# Patient Record
Sex: FEMALE | NOT HISPANIC OR LATINO | Employment: OTHER | RURAL
[De-identification: names, ages, dates, MRNs, and addresses within clinical notes are randomized per-mention and may not be internally consistent; named-entity substitution may affect disease eponyms.]

---

## 2022-04-18 ENCOUNTER — OFFICE VISIT (OUTPATIENT)
Dept: FAMILY MEDICINE CLINIC | Facility: CLINIC | Age: 37
End: 2022-04-18

## 2022-04-18 VITALS
WEIGHT: 232 LBS | HEIGHT: 65 IN | HEART RATE: 71 BPM | BODY MASS INDEX: 38.65 KG/M2 | DIASTOLIC BLOOD PRESSURE: 82 MMHG | SYSTOLIC BLOOD PRESSURE: 122 MMHG | OXYGEN SATURATION: 97 %

## 2022-04-18 DIAGNOSIS — E56.9 VITAMIN DEFICIENCY: ICD-10-CM

## 2022-04-18 DIAGNOSIS — E78.2 MIXED HYPERLIPIDEMIA: ICD-10-CM

## 2022-04-18 DIAGNOSIS — M54.50 LOW BACK PAIN WITHOUT SCIATICA, UNSPECIFIED BACK PAIN LATERALITY, UNSPECIFIED CHRONICITY: ICD-10-CM

## 2022-04-18 DIAGNOSIS — K21.9 GASTROESOPHAGEAL REFLUX DISEASE WITHOUT ESOPHAGITIS: ICD-10-CM

## 2022-04-18 DIAGNOSIS — Z00.00 ROUTINE MEDICAL EXAM: Primary | ICD-10-CM

## 2022-04-18 DIAGNOSIS — F32.A DEPRESSION, UNSPECIFIED DEPRESSION TYPE: ICD-10-CM

## 2022-04-18 DIAGNOSIS — Z13.1 SCREENING FOR DIABETES MELLITUS: ICD-10-CM

## 2022-04-18 DIAGNOSIS — G43.909 MIGRAINE WITHOUT STATUS MIGRAINOSUS, NOT INTRACTABLE, UNSPECIFIED MIGRAINE TYPE: ICD-10-CM

## 2022-04-18 PROCEDURE — 99395 PREV VISIT EST AGE 18-39: CPT | Performed by: NURSE PRACTITIONER

## 2022-04-18 PROCEDURE — 36415 COLL VENOUS BLD VENIPUNCTURE: CPT | Performed by: NURSE PRACTITIONER

## 2022-04-18 RX ORDER — SERTRALINE HYDROCHLORIDE 25 MG/1
25 TABLET, FILM COATED ORAL DAILY
Qty: 90 TABLET | Refills: 3 | Status: SHIPPED | OUTPATIENT
Start: 2022-04-18 | End: 2022-04-25

## 2022-04-18 RX ORDER — ATORVASTATIN CALCIUM 20 MG/1
TABLET, FILM COATED ORAL
COMMUNITY
End: 2022-04-18 | Stop reason: SDUPTHER

## 2022-04-18 RX ORDER — ESOMEPRAZOLE MAGNESIUM 40 MG/1
CAPSULE, DELAYED RELEASE ORAL
COMMUNITY
End: 2022-04-18 | Stop reason: SDUPTHER

## 2022-04-18 RX ORDER — FLUCONAZOLE 150 MG/1
TABLET ORAL
COMMUNITY
Start: 2022-03-29 | End: 2022-04-18

## 2022-04-18 RX ORDER — FAMOTIDINE 40 MG/1
TABLET, FILM COATED ORAL
COMMUNITY
End: 2022-04-18 | Stop reason: SDUPTHER

## 2022-04-18 RX ORDER — SERTRALINE HYDROCHLORIDE 25 MG/1
25 TABLET, FILM COATED ORAL DAILY
COMMUNITY
End: 2022-04-18 | Stop reason: SDUPTHER

## 2022-04-18 RX ORDER — RIZATRIPTAN BENZOATE 10 MG/1
TABLET, ORALLY DISINTEGRATING ORAL
COMMUNITY

## 2022-04-18 RX ORDER — FAMOTIDINE 40 MG/1
40 TABLET, FILM COATED ORAL 2 TIMES DAILY
Qty: 180 TABLET | Refills: 3 | Status: SHIPPED | OUTPATIENT
Start: 2022-04-18 | End: 2022-11-28

## 2022-04-18 RX ORDER — ATORVASTATIN CALCIUM 20 MG/1
20 TABLET, FILM COATED ORAL DAILY
Qty: 90 TABLET | Refills: 3 | Status: SHIPPED | OUTPATIENT
Start: 2022-04-18

## 2022-04-18 RX ORDER — TOPIRAMATE 25 MG/1
TABLET ORAL
COMMUNITY
Start: 2022-04-15 | End: 2022-11-28 | Stop reason: ALTCHOICE

## 2022-04-18 RX ORDER — CHLORDIAZEPOXIDE HYDROCHLORIDE AND CLIDINIUM BROMIDE 5; 2.5 MG/1; MG/1
CAPSULE ORAL EVERY 8 HOURS SCHEDULED
COMMUNITY
End: 2022-04-18 | Stop reason: SDUPTHER

## 2022-04-18 RX ORDER — ESOMEPRAZOLE MAGNESIUM 40 MG/1
40 CAPSULE, DELAYED RELEASE ORAL
Qty: 90 CAPSULE | Refills: 3 | Status: SHIPPED | OUTPATIENT
Start: 2022-04-18

## 2022-04-18 RX ORDER — CHLORDIAZEPOXIDE HYDROCHLORIDE AND CLIDINIUM BROMIDE 5; 2.5 MG/1; MG/1
1 CAPSULE ORAL
Qty: 270 CAPSULE | Refills: 3 | Status: SHIPPED | OUTPATIENT
Start: 2022-04-18 | End: 2023-03-27

## 2022-04-18 NOTE — PROGRESS NOTES
"Chief Complaint  Annual Exam    Subjective          Amanda Le presents to [facility] for preventative yearly exam.   Patient is here for a physical exam and medication refills.  She has been trying to lose weight with exercise and watching her diet but has not been able to do so.  Her periods have become irregular.      Objective   Vital Signs:   /82   Pulse 71   Ht 165.1 cm (65\")   Wt 105 kg (232 lb)   SpO2 97%   BMI 38.61 kg/m²     Body mass index is 38.61 kg/m².    Predictive Model Details   No score data available for Risk of Fall        PHQ-9 Depression Screening  Little interest or pleasure in doing things? 0-->not at all   Feeling down, depressed, or hopeless? 0-->not at all   Trouble falling or staying asleep, or sleeping too much?     Feeling tired or having little energy?     Poor appetite or overeating?     Feeling bad about yourself - or that you are a failure or have let yourself or your family down?     Trouble concentrating on things, such as reading the newspaper or watching television?     Moving or speaking so slowly that other people could have noticed? Or the opposite - being so fidgety or restless that you have been moving around a lot more than usual?     Thoughts that you would be better off dead, or of hurting yourself in some way?     PHQ-9 Total Score 0   If you checked off any problems, how difficult have these problems made it for you to do your work, take care of things at home, or get along with other people?       Health Maintenance   Topic Date Due   • ANNUAL PHYSICAL  Never done   • COVID-19 Vaccine (1) Never done   • HEPATITIS C SCREENING  Never done   • PAP SMEAR  Never done   • LIPID PANEL  Never done   • INFLUENZA VACCINE  08/01/2022   • TDAP/TD VACCINES (2 - Tdap) 08/01/2024   • Pneumococcal Vaccine 0-64  Aged Out        Immunization History   Administered Date(s) Administered   • Td 08/01/2014       Review of Systems "   Constitutional: Negative for fatigue and fever.   Respiratory: Negative for shortness of breath.    Cardiovascular: Negative for chest pain, palpitations and leg swelling.   Neurological: Negative for syncope.   Psychiatric/Behavioral: The patient is not nervous/anxious.         Negative depression        Past History:  Medical History: has a past medical history of High cholesterol (01/26/2016) and oral abscess.   Surgical History: has a past surgical history that includes Esophageal dilation (10/2019) and Cholecystectomy (2017).   Family History: family history includes Alzheimer's disease in her maternal grandfather; Coronary artery disease in her mother; Diabetes in her maternal grandfather, mother, and sister; Hypertension in her brother; Obesity in her brother.   Social History: reports that she has never smoked. She has never used smokeless tobacco. She reports current alcohol use. She reports that she does not use drugs.       Allergies: Sulfamethoxazole and Trimethoprim    Physical Exam  Constitutional:       Appearance: Normal appearance.   HENT:      Head: Normocephalic.   Eyes:      Conjunctiva/sclera: Conjunctivae normal.      Pupils: Pupils are equal, round, and reactive to light.   Cardiovascular:      Rate and Rhythm: Normal rate and regular rhythm.      Heart sounds: Normal heart sounds.   Pulmonary:      Effort: Pulmonary effort is normal.      Breath sounds: Normal breath sounds.   Abdominal:      Tenderness: There is no abdominal tenderness.   Musculoskeletal:         General: Normal range of motion.   Skin:     General: Skin is warm and dry.      Capillary Refill: Capillary refill takes less than 2 seconds.   Neurological:      General: No focal deficit present.      Mental Status: She is alert and oriented to person, place, and time.   Psychiatric:         Mood and Affect: Mood normal.         Behavior: Behavior normal.         Thought Content: Thought content normal.         Judgment:  Judgment normal.          Result Review :                   Assessment and Plan    Diagnoses and all orders for this visit:    1. Routine medical exam (Primary)  Comments:  We discussed diet and exercise.  Labs drawn.  Patient will schedule Pap smear when desired.  Orders:  -     CBC & Differential; Future  -     Comprehensive Metabolic Panel; Future  -     TSH Rfx On Abnormal To Free T4; Future  -     CBC & Differential  -     Comprehensive Metabolic Panel  -     TSH Rfx On Abnormal To Free T4    2. Screening for diabetes mellitus  -     Hemoglobin A1c; Future  -     Hemoglobin A1c    3. Mixed hyperlipidemia  Assessment & Plan:  Continue current medications.  We discussed diet and exercise.  Labs drawn.    Orders:  -     atorvastatin (LIPITOR) 20 MG tablet; Take 1 tablet by mouth Daily.  Dispense: 90 tablet; Refill: 3  -     Lipid Panel; Future  -     Lipid Panel    4. Vitamin deficiency  -     Vitamin B12; Future  -     Vitamin D 25 Hydroxy; Future  -     Folate; Future  -     Vitamin B12  -     Vitamin D 25 Hydroxy  -     Folate    5. Low back pain without sciatica, unspecified back pain laterality, unspecified chronicity  Comments:  X-rays done.  Apply ice to painful areas and ROM stretching.  Return for worsening symptoms.  Orders:  -     XR Spine Lumbar 2 or 3 View; Future  -     XR Sacroiliac Joints 1 or 2 View; Future    6. Gastroesophageal reflux disease without esophagitis  Comments:  Continue current medications.  Assessment & Plan:  Continue current medications.    Orders:  -     clidinium-chlordiazePOXIDE (LIBRAX) 5-2.5 MG per capsule; Take 1 capsule by mouth 3 (Three) Times a Day With Meals.  Dispense: 270 capsule; Refill: 3  -     esomeprazole (nexIUM) 40 MG capsule; Take 1 capsule by mouth Every Morning Before Breakfast.  Dispense: 90 capsule; Refill: 3  -     famotidine (PEPCID) 40 MG tablet; Take 1 tablet by mouth 2 (Two) Times a Day.  Dispense: 180 tablet; Refill: 3    7. Depression, unspecified  depression type  Assessment & Plan:  Condition controlled.  Continue current medications.    Orders:  -     sertraline (ZOLOFT) 25 MG tablet; Take 1 tablet by mouth Daily.  Dispense: 90 tablet; Refill: 3    8. Migraine without status migrainosus, not intractable, unspecified migraine type  Comments:  Continue current medications                  Current Outpatient Medications:   •  atorvastatin (LIPITOR) 20 MG tablet, Take 1 tablet by mouth Daily., Disp: 90 tablet, Rfl: 3  •  clidinium-chlordiazePOXIDE (LIBRAX) 5-2.5 MG per capsule, Take 1 capsule by mouth 3 (Three) Times a Day With Meals., Disp: 270 capsule, Rfl: 3  •  esomeprazole (nexIUM) 40 MG capsule, Take 1 capsule by mouth Every Morning Before Breakfast., Disp: 90 capsule, Rfl: 3  •  famotidine (PEPCID) 40 MG tablet, Take 1 tablet by mouth 2 (Two) Times a Day., Disp: 180 tablet, Rfl: 3  •  rizatriptan MLT (MAXALT-MLT) 10 MG disintegrating tablet, Maxalt-MLT 10 mg disintegrating tablet  Take 1 tablet as needed by oral route., Disp: , Rfl:   •  sertraline (ZOLOFT) 25 MG tablet, Take 1 tablet by mouth Daily., Disp: 90 tablet, Rfl: 3  •  topiramate (TOPAMAX) 25 MG tablet, , Disp: , Rfl:     Follow Up   Return in about 6 months (around 10/18/2022).  Patient was given instructions and counseling regarding her condition or for health maintenance advice. Please see specific information pulled into the AVS if appropriate.     ABHIJIT Maynard

## 2022-04-19 ENCOUNTER — PATIENT MESSAGE (OUTPATIENT)
Dept: FAMILY MEDICINE CLINIC | Facility: CLINIC | Age: 37
End: 2022-04-19

## 2022-04-19 LAB
25(OH)D3+25(OH)D2 SERPL-MCNC: 33.3 NG/ML (ref 30–100)
ALBUMIN SERPL-MCNC: 4.6 G/DL (ref 3.8–4.8)
ALBUMIN/GLOB SERPL: 1.5 {RATIO} (ref 1.2–2.2)
ALP SERPL-CCNC: 81 IU/L (ref 44–121)
ALT SERPL-CCNC: 15 IU/L (ref 0–32)
AST SERPL-CCNC: 13 IU/L (ref 0–40)
BASOPHILS # BLD AUTO: 0.1 X10E3/UL (ref 0–0.2)
BASOPHILS NFR BLD AUTO: 1 %
BILIRUB SERPL-MCNC: 0.3 MG/DL (ref 0–1.2)
BUN SERPL-MCNC: 15 MG/DL (ref 6–20)
BUN/CREAT SERPL: 17 (ref 9–23)
CALCIUM SERPL-MCNC: 9.5 MG/DL (ref 8.7–10.2)
CHLORIDE SERPL-SCNC: 104 MMOL/L (ref 96–106)
CHOLEST SERPL-MCNC: 207 MG/DL (ref 100–199)
CO2 SERPL-SCNC: 19 MMOL/L (ref 20–29)
CREAT SERPL-MCNC: 0.86 MG/DL (ref 0.57–1)
EGFRCR SERPLBLD CKD-EPI 2021: 90 ML/MIN/1.73
EOSINOPHIL # BLD AUTO: 0.4 X10E3/UL (ref 0–0.4)
EOSINOPHIL NFR BLD AUTO: 4 %
ERYTHROCYTE [DISTWIDTH] IN BLOOD BY AUTOMATED COUNT: 13.6 % (ref 11.7–15.4)
FOLATE SERPL-MCNC: >20 NG/ML
GLOBULIN SER CALC-MCNC: 3 G/DL (ref 1.5–4.5)
GLUCOSE SERPL-MCNC: 100 MG/DL (ref 65–99)
HBA1C MFR BLD: 5.3 % (ref 4.8–5.6)
HCT VFR BLD AUTO: 40.2 % (ref 34–46.6)
HDLC SERPL-MCNC: 35 MG/DL
HGB BLD-MCNC: 13.2 G/DL (ref 11.1–15.9)
IMM GRANULOCYTES # BLD AUTO: 0.1 X10E3/UL (ref 0–0.1)
IMM GRANULOCYTES NFR BLD AUTO: 1 %
LDLC SERPL CALC-MCNC: 109 MG/DL (ref 0–99)
LYMPHOCYTES # BLD AUTO: 2.6 X10E3/UL (ref 0.7–3.1)
LYMPHOCYTES NFR BLD AUTO: 28 %
MCH RBC QN AUTO: 29.3 PG (ref 26.6–33)
MCHC RBC AUTO-ENTMCNC: 32.8 G/DL (ref 31.5–35.7)
MCV RBC AUTO: 89 FL (ref 79–97)
MONOCYTES # BLD AUTO: 0.5 X10E3/UL (ref 0.1–0.9)
MONOCYTES NFR BLD AUTO: 5 %
NEUTROPHILS # BLD AUTO: 5.7 X10E3/UL (ref 1.4–7)
NEUTROPHILS NFR BLD AUTO: 61 %
PLATELET # BLD AUTO: 275 X10E3/UL (ref 150–450)
POTASSIUM SERPL-SCNC: 3.9 MMOL/L (ref 3.5–5.2)
PROT SERPL-MCNC: 7.6 G/DL (ref 6–8.5)
RBC # BLD AUTO: 4.51 X10E6/UL (ref 3.77–5.28)
SODIUM SERPL-SCNC: 137 MMOL/L (ref 134–144)
TRIGL SERPL-MCNC: 368 MG/DL (ref 0–149)
TSH SERPL DL<=0.005 MIU/L-ACNC: 2.76 UIU/ML (ref 0.45–4.5)
VIT B12 SERPL-MCNC: 628 PG/ML (ref 232–1245)
VLDLC SERPL CALC-MCNC: 63 MG/DL (ref 5–40)
WBC # BLD AUTO: 9.3 X10E3/UL (ref 3.4–10.8)

## 2022-04-20 NOTE — PROGRESS NOTES
Here are your recent lab results.  Your cholesterol levels were elevated but but not terrible.  Lets continue your current medication and try to watch her diet more closely for sugars, fats, and carbohydrates.  Try to exercise several days a week if possible.  Everything else looked good.  Take care!

## 2022-04-21 DIAGNOSIS — M54.50 LUMBAR PAIN: Primary | ICD-10-CM

## 2022-05-12 DIAGNOSIS — E78.2 MIXED HYPERLIPIDEMIA: ICD-10-CM

## 2022-05-12 RX ORDER — ATORVASTATIN CALCIUM 20 MG/1
TABLET, FILM COATED ORAL
Qty: 30 TABLET | OUTPATIENT
Start: 2022-05-12

## 2022-07-12 ENCOUNTER — OFFICE VISIT (OUTPATIENT)
Dept: FAMILY MEDICINE CLINIC | Facility: CLINIC | Age: 37
End: 2022-07-12

## 2022-07-12 VITALS
SYSTOLIC BLOOD PRESSURE: 122 MMHG | BODY MASS INDEX: 39.49 KG/M2 | OXYGEN SATURATION: 98 % | HEIGHT: 65 IN | WEIGHT: 237 LBS | DIASTOLIC BLOOD PRESSURE: 80 MMHG | HEART RATE: 88 BPM

## 2022-07-12 DIAGNOSIS — N39.0 URINARY TRACT INFECTION WITHOUT HEMATURIA, SITE UNSPECIFIED: Primary | ICD-10-CM

## 2022-07-12 DIAGNOSIS — G58.8 PUDENDAL NEURALGIA: ICD-10-CM

## 2022-07-12 DIAGNOSIS — M54.50 LUMBAR PAIN: ICD-10-CM

## 2022-07-12 PROBLEM — R07.89 ATYPICAL CHEST PAIN: Status: ACTIVE | Noted: 2021-05-11

## 2022-07-12 PROBLEM — R13.19 ESOPHAGEAL DYSPHAGIA: Status: ACTIVE | Noted: 2021-05-11

## 2022-07-12 PROBLEM — R09.A2 SENSATION OF LUMP IN THROAT: Status: ACTIVE | Noted: 2021-05-11

## 2022-07-12 PROBLEM — R22.1 SENSATION OF LUMP IN THROAT: Status: ACTIVE | Noted: 2021-05-11

## 2022-07-12 LAB
BILIRUB BLD-MCNC: ABNORMAL MG/DL
CLARITY, POC: CLEAR
COLOR UR: YELLOW
EXPIRATION DATE: ABNORMAL
GLUCOSE UR STRIP-MCNC: NEGATIVE MG/DL
KETONES UR QL: NEGATIVE
LEUKOCYTE EST, POC: ABNORMAL
Lab: ABNORMAL
NITRITE UR-MCNC: POSITIVE MG/ML
PH UR: 6 [PH] (ref 5–8)
PROT UR STRIP-MCNC: ABNORMAL MG/DL
RBC # UR STRIP: ABNORMAL /UL
SP GR UR: 1.02 (ref 1–1.03)
UROBILINOGEN UR QL: NORMAL

## 2022-07-12 PROCEDURE — 81003 URINALYSIS AUTO W/O SCOPE: CPT | Performed by: NURSE PRACTITIONER

## 2022-07-12 PROCEDURE — 99214 OFFICE O/P EST MOD 30 MIN: CPT | Performed by: NURSE PRACTITIONER

## 2022-07-12 RX ORDER — GABAPENTIN 100 MG/1
100 CAPSULE ORAL 3 TIMES DAILY
Qty: 90 CAPSULE | Refills: 1 | Status: SHIPPED | OUTPATIENT
Start: 2022-07-12 | End: 2022-09-13

## 2022-07-12 RX ORDER — CIPROFLOXACIN 500 MG/1
500 TABLET, FILM COATED ORAL 2 TIMES DAILY
Qty: 20 TABLET | Refills: 0 | Status: SHIPPED | OUTPATIENT
Start: 2022-07-12 | End: 2022-11-28

## 2022-07-12 NOTE — PROGRESS NOTES
"Chief Complaint  Back Pain (Follow up) and Urinary Tract Infection (Follow up)    Subjective          Amanda Le presents to [facility]  Pt is here to follow up on her back pain. She has been doing PT and states her back pain has improved mildly. She has ongoing pelvic burning/stinging/pain. She has seen GYN for this several times. She has had dysuria and frequency for the past several weeks. She finished Clindamycin from [facility].      Objective   Vital Signs:   /80   Pulse 88   Ht 165.1 cm (65\")   Wt 108 kg (237 lb)   SpO2 98%   BMI 39.44 kg/m²     Body mass index is 39.44 kg/m².    Review of Systems   Constitutional: Negative for fatigue and fever.   Respiratory: Negative for shortness of breath.    Cardiovascular: Negative for chest pain, palpitations and leg swelling.   Genitourinary: Positive for dysuria, pelvic pain, urgency and vaginal pain.   Musculoskeletal: Positive for back pain.   Neurological: Negative for syncope.   Psychiatric/Behavioral: The patient is not nervous/anxious.           Current Outpatient Medications:   •  atorvastatin (LIPITOR) 20 MG tablet, Take 1 tablet by mouth Daily., Disp: 90 tablet, Rfl: 3  •  clidinium-chlordiazePOXIDE (LIBRAX) 5-2.5 MG per capsule, Take 1 capsule by mouth 3 (Three) Times a Day With Meals., Disp: 270 capsule, Rfl: 3  •  esomeprazole (nexIUM) 40 MG capsule, Take 1 capsule by mouth Every Morning Before Breakfast., Disp: 90 capsule, Rfl: 3  •  famotidine (PEPCID) 40 MG tablet, Take 1 tablet by mouth 2 (Two) Times a Day., Disp: 180 tablet, Rfl: 3  •  rizatriptan MLT (MAXALT-MLT) 10 MG disintegrating tablet, Maxalt-MLT 10 mg disintegrating tablet  Take 1 tablet as needed by oral route., Disp: , Rfl:   •  sertraline (Zoloft) 50 MG tablet, Take 1 tablet by mouth Daily., Disp: 90 tablet, Rfl: 3  •  topiramate (TOPAMAX) 25 MG tablet, , Disp: , Rfl:       Allergies: Sulfamethoxazole and Trimethoprim    Physical " Exam  Constitutional:       Appearance: Normal appearance.   HENT:      Head: Normocephalic.   Eyes:      Conjunctiva/sclera: Conjunctivae normal.      Pupils: Pupils are equal, round, and reactive to light.   Cardiovascular:      Rate and Rhythm: Normal rate and regular rhythm.      Heart sounds: Normal heart sounds.   Pulmonary:      Effort: Pulmonary effort is normal.      Breath sounds: Normal breath sounds.   Abdominal:      Tenderness: There is no abdominal tenderness.   Musculoskeletal:         General: Normal range of motion.      Comments: Lumbar tenderness   Skin:     General: Skin is warm and dry.      Capillary Refill: Capillary refill takes less than 2 seconds.   Neurological:      General: No focal deficit present.      Mental Status: She is alert and oriented to person, place, and time.   Psychiatric:         Mood and Affect: Mood normal.         Behavior: Behavior normal.         Thought Content: Thought content normal.         Judgment: Judgment normal.          Result Review :                   Assessment and Plan    Diagnoses and all orders for this visit:    1. Urinary tract infection without hematuria, site unspecified (Primary)  Comments:  UA and cx done. Increase fluids and void often.   Orders:  -     Urine Culture - Urine, Urine, Clean Catch; Future  -     POC Urinalysis Dipstick, Automated    2. Lumbar pain  Comments:  MRI ordered. We will refer to neurosurgeon. Try Gris.  Orders:  -     MRI Lumbar Spine Without Contrast; Future    3. Pudendal neuralgia  Comments:  Try Gabapentin.                  Follow Up   Return in about 1 week (around 7/19/2022) for if not improving or sooner if symptoms worsen.  Patient was given instructions and counseling regarding her condition or for health maintenance advice. Please see specific information pulled into the AVS if appropriate.     ABHIJIT Maynard

## 2022-07-16 LAB
BACTERIA UR CULT: ABNORMAL
BACTERIA UR CULT: ABNORMAL
OTHER ANTIBIOTIC SUSC ISLT: ABNORMAL

## 2022-07-18 NOTE — PROGRESS NOTES
Your urine culture showed infection and the antibiotic that I sent should take care of it.  Let me know if your symptoms are not improving.  Take care!

## 2022-07-22 ENCOUNTER — PATIENT MESSAGE (OUTPATIENT)
Dept: FAMILY MEDICINE CLINIC | Facility: CLINIC | Age: 37
End: 2022-07-22

## 2022-07-25 NOTE — TELEPHONE ENCOUNTER
From: Amanda Le  To: ABHIJIT Maynard  Sent: 7/22/2022 12:19 PM EDT  Subject: MRI     Hi Nikki! Thank you for the follow up on my UTI I’m feeling much better.    I haven’t heard anything from the MRI scheduling people in [place]. I was just check in on this. Thank you!    Have a great weekend!  Domi

## 2022-08-02 ENCOUNTER — TELEPHONE (OUTPATIENT)
Dept: FAMILY MEDICINE CLINIC | Facility: CLINIC | Age: 37
End: 2022-08-02

## 2022-09-11 DIAGNOSIS — G58.8 PUDENDAL NEURALGIA: ICD-10-CM

## 2022-09-13 RX ORDER — GABAPENTIN 100 MG/1
CAPSULE ORAL
Qty: 90 CAPSULE | Refills: 0 | Status: SHIPPED | OUTPATIENT
Start: 2022-09-13 | End: 2022-11-28

## 2022-09-13 NOTE — TELEPHONE ENCOUNTER
Rx Refill Note  Requested Prescriptions     Pending Prescriptions Disp Refills    gabapentin (NEURONTIN) 100 MG capsule [Pharmacy Med Name: GABAPENTIN 100 MG CAPSULE] 90 capsule      Sig: TAKE ONE CAPSULE BY MOUTH THREE TIMES A DAY      Last office visit with prescribing clinician: 7/12/2022      Next office visit with prescribing clinician: Visit date not found            Candi Cool MA  09/13/22, 14:56 EDT

## 2022-10-14 DIAGNOSIS — G58.8 PUDENDAL NEURALGIA: ICD-10-CM

## 2022-10-15 RX ORDER — GABAPENTIN 100 MG/1
CAPSULE ORAL
Qty: 90 CAPSULE | OUTPATIENT
Start: 2022-10-15

## 2022-11-22 ENCOUNTER — E-VISIT (OUTPATIENT)
Dept: FAMILY MEDICINE CLINIC | Facility: TELEHEALTH | Age: 37
End: 2022-11-22
Payer: COMMERCIAL

## 2022-11-22 PROCEDURE — BRIGHTMDVISIT: Performed by: NURSE PRACTITIONER

## 2022-11-22 NOTE — EXTERNAL PATIENT INSTRUCTIONS
Diagnosis   Yeast infection (vulvovaginal candidiasis)   My name is Barby Wong, and I'm a healthcare provider at [facility]. Based on your interview, I see you have a yeast infection. A yeast infection isn't considered a sexually transmitted infection (STI).   Medications   Your pharmacy   [facility] [id number] [place] [address] [address] [place] [address] [phone]     Prescription   Fluconazole (150mg): Take 1 tablet by mouth once for 1 day as a single dose. If symptoms are still present after 3 days, you may take a second tablet.   About your diagnosis   Your vagina naturally and normally contains a balance of yeast and bacteria. When this balance is upset, an overgrowth of yeast can occur, causing the symptoms of a yeast infection. Antibiotic use, hormonal changes, poorly controlled diabetes, and stress can all affect the balance of organisms in the vagina.   Vaginal yeast infections are very common. In fact, about 3 in 4 women will have a yeast infection at some point in their lives. Yeast infection symptoms aren't usually serious.   What to expect   If you follow this treatment plan, you should feel better within 1 week.   When to seek care   Call us at [phone]   with any sudden or unexpected symptoms.    Symptoms that change or get worse.    Symptoms that don't go away even after completing your treatment plan.    A fever of 101F or higher.    Vaginal discharge with an unusual odor.    Frothy or foamy vaginal discharge.    Green or yellow vaginal discharge.    Spotting or bleeding unrelated to your period.    Severe abdominal cramping or pain.    Sores on your genital area.    Vomiting.    You mentioned having had 4 or more yeast infections in the last year. Please speak with your healthcare provider to discuss ways to prevent frequent yeast infections.   Other treatment   For vaginal itching or burning, apply a cold compress or washcloth.   Prevention    Wear cotton  underwear. If you wear pantyhose or tights, choose brands with a cotton crotch.    Wear loose-fitting clothes made from natural fibers.    Change out of workout clothes right after exercising.    After bathing, dry off completely before you get dressed.    Always wipe from front to back when you use the toilet.    Use only unscented and dye-free toilet paper.    Consider sleeping without underwear.    Use only unscented sanitary pads or tampons.    It's best to avoid douching products. Douching can increase the risk of vaginal infections, such as yeast infections.   Your provider   Your diagnosis was provided by Barby Wong, a member of your trusted care team at [facility].   If you have any questions, call us at [phone]  .

## 2022-11-22 NOTE — E-VISIT TREATED
Chief Complaint: Yeast infection   Patient introduction   Patient is 37-year-old female presenting with 4 to 7 days of vulvar pruritus, vaginal pruritus, vulvar burning, vaginal burning, dyspareunia, and dysuria.   Patient-submitted comments   Patient writes: I am severely allergic to Bactrim (sulfa drugs). One day OTC yeast infection medications cause severe pain, but 7 days seem to be okay. .   General presentation   Burning during the entire duration of urination.   Vaginal symptoms include genital erythema and pain.   Symptom onset was after menses.   Urinary symptoms: strong-smelling urine.   Sexually active in the past 90 days. No new sex partner in previous 2 weeks. Did not receive STI treatment within the past 3 months.   No hormonal medication. Has not recently used douching products or products containing nonoxynol-9.   No new or recent use of possible irritants. Did not take antibiotics in the last 2 weeks.   Positive history of vulvovaginal candidiasis with 4 or more episodes in the previous 12 months. Current symptoms are similar to previous episodes of vulvovaginal candidiasis.   Has tried an OTC topical yeast infection treatment, topical hydrocortisone, and a vaginal wash for current symptoms.   No history of treatment for bacterial vaginosis.   Review of red flags/alarm symptoms:    No symptoms suggesting cellulitis    No genital trauma    No genital sores    No abdominal or pelvic surgery within the last month    No fever    No vomiting    No abdominal pain    No retained foreign object in vagina    No treatment for an STI in the last 3 months    No sexual partner tested positive for an STI   Pregnancy/menstrual status/breastfeeding: Not pregnant. Not breastfeeding. Regarding last menstrual period, patient writes: None.   Preferred medication route:   Prefers oral treatment option.   Current medications   Currently taking rizatriptan MLT 10 MG disintegrating tablet, atorvastatin 20 MG tablet,  sertraline 50 MG tablet, clidinium-chlordiazePOXIDE 5-2.5 MG per capsule, esomeprazole 40 MG capsule, Vitamin A / Vitamin D Oral Capsule, Iron, Fish Oils, and Collagen.   Medication allergies    Nitroimidazoles    Tioconazole   Medication contraindication review   None.   Past medical history   No diabetes mellitus.   Immune conditions: No immunocompromising conditions. No history of cancer.   Social history   Never smoked tobacco.   Assessment   Vulvovaginal candidiasis.   This is the likely diagnosis based on patient's interview responses, including:    Vulvar pruritus    Vaginal pruritus    Vulvar burning    Vaginal burning    Dyspareunia    Dysuria    Prior diagnosis of vulvovaginal candidiasis with similar symptoms    Genital erythema   Plan   Medications:    fluconazole 150 mg tablet RX 150mg 1 tab PO once 1d as a single dose for vaginal yeast infection. Repeat in 72 hours if symptoms persist. Amount is 2 tab.   The patient's prescription will be sent to:   [facility] [id number]   [address] [place] [address] [address] Reece GARCIA [id number]   Phone: [phone]     Fax: [phone]   Education:    Condition and causes    Prevention    Treatment and self-care    When to call provider   Follow-up:   Patient to follow up as needed for progression or lack of improvement in symptoms within 7 to 10 days.   ----------   Electronically signed by ABHIJIT Veloz on 2022-11-22 at 15:05PM   ----------   Patient Interview Transcript:   Which of these symptoms are bothering you? Select all that apply.    Itching around my vagina    Itching in my vagina    Burning around my vagina    Burning in my vagina    Pain during sex    Burning with urination   Not selected:    Vaginal discharge that looks different than usual    Fishy-smelling vaginal discharge    None of the above   Do you have any of these symptoms? Select all that apply.    Strong-smelling urine   Not selected:    Frequent, small amounts of urine    Sudden,  strong urge to urinate    Cloudy urine    Blood in the urine    None of the above   How long have you had these symptoms? Select one.    4 to 7 days   Not selected:    Less than 24 hours    1 to 3 days    More than 7 days   You told us that it burns when you urinate. When exactly does it burn? Select one.    The entire time I'm urinating   Not selected:    Only when I start urinating    Only when I finish urinating    I'm not sure   Do you have any of these vaginal symptoms? Select all that apply.    Redness    Pain   Not selected:    Dryness    Unexpected bleeding or spotting between periods or after menopause    Swelling    Darkening of the skin    None of the above   Do any of these statements apply to the redness or swelling around your vagina? Select all that apply.    None of these   Not selected:    The area is spreading and becoming larger    The area feels unusually warm to the touch    The area feels firm to the touch    There are also bumps that are draining pus   Since your symptoms started, have you used a vaginal health screening kit to check the acidity (pH) of your vaginal discharge? These kits are available without a prescription at many drug stores and pharmacies. Common brands include Monistat Complete Care Vaginal Health Test and Vagisil Screening Kit. Select one.    No   Not selected:    Yes   Have you noticed any sores on your genital area? This includes blisters or ulcers. Select one.    No   Not selected:    Yes   Along with your vaginal symptoms, have you had any of these symptoms? Select all that apply.    None of the above   Not selected:    Fever    Vomiting    Abdominal (stomach) pain   Before your symptoms began, did you have an injury to your genital area or vagina? Select one.    No   Not selected:    Yes   Could an object like a tampon or condom be stuck inside your vagina? Select one.    No   Not selected:    Yes   In the 2 weeks before your symptoms began, did you use either of  these products? Select all that apply.    None of the above   Not selected:    Douching products    Products containing nonoxynol-9, a spermicide found in condoms, sponges, vaginal films, and jellies   In the week before your symptoms started, did any of these come into contact with your genital area? Select all that apply.    None of the above   Not selected:    New soap    Underwear washed with a new laundry detergent    New sex toy    New cream or lotion    New medication    New perfume    New feminine or flushable wipe    Other new product (specify)   Have you had a yeast infection before? Select one.    Yes, and my current symptoms feel the same as before   Not selected:    Yes, but my current symptoms feel different than before    No   How many yeast infections have you had in the last 12 months? Select one.    4 or more   Not selected:    0    1 to 3   Have you ever been treated for bacterial vaginosis (BV)? Select one.    No   Not selected:    Yes   In the past, have you developed yeast infections as a result of taking oral antibiotics? Select one.    Yes   Not selected:    No   Were you sexually active at any time in the last 3 months? Select one.    Yes   Not selected:    No   Have you had sex with a new partner in the last 2 weeks? Select one.    No   Not selected:    Yes   Have you had sex with 3 or more partners in the last 3 months? Select one.    No   Not selected:    Yes   In the last 3 months, were you treated for a sexually transmitted infection (STI)? Examples of STIs include chlamydia, gonorrhea, trichomoniasis (trich), herpes, or syphilis. Select one.    No   Not selected:    Yes   Has your sexual partner(s) recently tested positive for a sexually transmitted infection (STI)? Select all that apply.    No, not that I know of   Not selected:    Yes, chlamydia    Yes, gonorrhea    Yes, trichomoniasis (trich)    Other (specify)   Are you pregnant? Select one.    No   Not selected:    Yes   When was  "your last menstrual period? If you don't currently have periods or no longer have periods, please briefly explain.    11/6/2022   Are you breastfeeding? Select one.    No   Not selected:    Yes   Did your symptoms start before, during, or after your menstrual period? Select one.    After   Not selected:    Before    During    I don't currently have periods or no longer have periods    I'm not sure   Have you recently had abdominal or pelvic surgery? Select one.    No   Not selected:    Yes, within the last month    Yes, within the last 3 months   Are you currently being treated for Type 1 or Type 2 diabetes? Select one.    No   Not selected:    Yes   Do you have any of these conditions that can affect the immune system? Scroll to see all options. Select all that apply.    None of these   Not selected:    History of bone marrow transplant    Chronic kidney disease    Chronic liver disease (including cirrhosis)    HIV/AIDS    Inflammatory bowel disease (Crohn's disease or ulcerative colitis)    Lupus    Moderate to severe plaque psoriasis    Multiple sclerosis    Rheumatoid arthritis    Sickle cell anemia    Alpha or beta thalassemia    History of solid organ transplant (kidney, liver, or heart)    History of spleen removal    An autoimmune disorder not listed here    A condition requiring treatment with long-term use of oral steroids (such as prednisone, prednisolone, or dexamethasone)   Have you ever been diagnosed with cancer? Select one.    No   Not selected:    Yes, I have cancer now    Yes, but I'm in remission   Have you been treated for antibiotic-associated colitis in the last month? This is also called \"C. diff colitis.\" It's commonly caused by the bacteria Clostridium difficile. Select one.    No   Not selected:    Yes   Have you ever been diagnosed with the heart condition known as prolonged QT interval or QT prolongation? Select one.    No   Not selected:    Yes   Do you smoke tobacco? Select one.    No, " never   Not selected:    Yes, every day    Yes, some days    No, I quit   Have you tried any of these over-the-counter treatments for your current symptoms? Select all that apply.    Vaginal cream, ointment, or suppository for yeast infection    Anti-itch cream or ointment containing hydrocortisone    Vaginal wash   Not selected:    Vaginal wipes, such as Summer's Catrachita    Homeopathic treatment    Other (specify)    I haven't tried anything for my symptoms   Do you take or use any of these medications containing estrogen or progesterone? Select one.    None of the above   Not selected:    Birth control pills    Hormonal intrauterine device (IUD)    Contraceptive patch    Vaginal ring, such as NuvaRing    Birth control shot, such as Depo-Provera    Hormone replacement therapy (HRT), such as oral and topical medication, vaginal ring, and transdermal patch   In the last 2 weeks, have you taken oral antibiotics? Oral antibiotics are medications that you take by mouth to treat a bacterial infection. Select one.    No   Not selected:    Yes   Are you still taking these medications listed in your medical record? If you're not taking any of these, click Next. Select all that apply.    rizatriptan MLT 10 MG disintegrating tablet    atorvastatin 20 MG tablet    sertraline 50 MG tablet    clidinium-chlordiazePOXIDE 5-2.5 MG per capsule    esomeprazole 40 MG capsule   Are you taking any other medications, vitamins, or supplements? Select one.    Yes   Not selected:    No   Have you ever had an allergic or bad reaction to any medication? Select one.    Yes   Not selected:    No   Have you had an allergic or bad reaction to any of these medications? Select all that apply.    Metronidazole (Flagyl, MetroGel Vaginal)    Tioconazole (Monistat 1-Day)   Not selected:    Cetirizine or levocetirizine (Xyzal, Zyrtec)    Clindamycin or lincomycin (Cleocin, ClindaMax, Clindesse, and Lincocin)    Clotrimazole (Gyne-Lotrimin)     Corticosteroids, such as hydrocortisone, triamcinolone, and clobetasol (Triderm, Oralone, and Temovate)    Diphenhydramine (Benadryl)    Fluconazole (Diflucan)    Hydroxyzine (Atarax, Vistaril)    Miconazole (Monistat 1, Monistat 3, Monistat 7)    Tinidazole (Tindamax)    None of these   Have you used the medication disulfiram (Antabuse) in the last 2 weeks? Select one.    No   Not selected:    Yes   If medication is needed, would you prefer oral or vaginal medication? - Oral medications are pills that you swallow. - Vaginal medications are gels, creams, ointments, or suppositories that are placed in the vagina. We'll try to provide medication in the form you prefer, but that's not always possible. Select one.    Oral   Not selected:    Vaginal    No preference   Is there anything else you'd like to tell us about your symptoms?    I am severely allergic to Bactrim (sulfa drugs). One day OTC yeast infection medications cause severe pain, but 7 days seem to be okay.   ----------   Medical history   Medical history data does not currently exist for this patient.

## 2022-11-28 ENCOUNTER — OFFICE VISIT (OUTPATIENT)
Dept: FAMILY MEDICINE CLINIC | Facility: CLINIC | Age: 37
End: 2022-11-28

## 2022-11-28 VITALS
SYSTOLIC BLOOD PRESSURE: 112 MMHG | OXYGEN SATURATION: 99 % | HEIGHT: 65 IN | DIASTOLIC BLOOD PRESSURE: 76 MMHG | BODY MASS INDEX: 40.32 KG/M2 | HEART RATE: 67 BPM | WEIGHT: 242 LBS

## 2022-11-28 DIAGNOSIS — R13.19 ESOPHAGEAL DYSPHAGIA: ICD-10-CM

## 2022-11-28 DIAGNOSIS — G89.29 CHRONIC HIP PAIN, RIGHT: Primary | ICD-10-CM

## 2022-11-28 DIAGNOSIS — M25.551 CHRONIC HIP PAIN, RIGHT: Primary | ICD-10-CM

## 2022-11-28 DIAGNOSIS — K21.9 GASTROESOPHAGEAL REFLUX DISEASE WITHOUT ESOPHAGITIS: ICD-10-CM

## 2022-11-28 PROCEDURE — 99214 OFFICE O/P EST MOD 30 MIN: CPT | Performed by: FAMILY MEDICINE

## 2022-11-28 PROCEDURE — 96372 THER/PROPH/DIAG INJ SC/IM: CPT | Performed by: FAMILY MEDICINE

## 2022-11-28 RX ORDER — TRIAMCINOLONE ACETONIDE 40 MG/ML
80 INJECTION, SUSPENSION INTRA-ARTICULAR; INTRAMUSCULAR ONCE
Status: COMPLETED | OUTPATIENT
Start: 2022-11-28 | End: 2022-11-28

## 2022-11-28 RX ORDER — FREMANEZUMAB-VFRM 225 MG/1.5ML
INJECTION SUBCUTANEOUS
COMMUNITY
Start: 2022-10-21

## 2022-11-28 RX ADMIN — TRIAMCINOLONE ACETONIDE 80 MG: 40 INJECTION, SUSPENSION INTRA-ARTICULAR; INTRAMUSCULAR at 11:16

## 2022-12-07 ENCOUNTER — TELEPHONE (OUTPATIENT)
Dept: FAMILY MEDICINE CLINIC | Facility: CLINIC | Age: 37
End: 2022-12-07

## 2022-12-07 NOTE — TELEPHONE ENCOUNTER
Provider: SYLVIE PATHAK MD    Caller: JUDE SCHERER    Phone Number: [phone]    Reason for Call: PATIENT HAS RECEIVED TWO LETTERS FROM HER INSURANCE COMPANY DENYING HER OF HER MRI ORDER DUE TO NOT BEING MEDICALLY NECESSARY.    PATIENT WOULD LIKE TO KNOW WHAT THE NEXT STEP IS. PATIENT HAS TRIED PHYSICAL THERAPY AND MEDICATION WHICH ARE ALL SUGGESTED WITHIN THE LETTER.    When was the patient last seen: 11/28/22    PATIENT WOULD LIKE A CALL BACK TO DISCUSS THE MATTER

## 2022-12-23 DIAGNOSIS — N39.0 URINARY TRACT INFECTION WITHOUT HEMATURIA, SITE UNSPECIFIED: ICD-10-CM

## 2022-12-23 RX ORDER — CIPROFLOXACIN 500 MG/1
TABLET, FILM COATED ORAL
Qty: 20 TABLET | Refills: 0 | OUTPATIENT
Start: 2022-12-23

## 2023-03-24 ENCOUNTER — OFFICE VISIT (OUTPATIENT)
Dept: FAMILY MEDICINE CLINIC | Facility: CLINIC | Age: 38
End: 2023-03-24
Payer: COMMERCIAL

## 2023-03-24 VITALS
RESPIRATION RATE: 17 BRPM | HEART RATE: 75 BPM | DIASTOLIC BLOOD PRESSURE: 84 MMHG | SYSTOLIC BLOOD PRESSURE: 118 MMHG | HEIGHT: 65 IN | OXYGEN SATURATION: 100 % | WEIGHT: 243.44 LBS | BODY MASS INDEX: 40.56 KG/M2

## 2023-03-24 DIAGNOSIS — E61.1 IRON DEFICIENCY: ICD-10-CM

## 2023-03-24 DIAGNOSIS — R61 SWEATING INCREASE: Primary | ICD-10-CM

## 2023-03-24 DIAGNOSIS — E87.6 HYPOKALEMIA: ICD-10-CM

## 2023-03-24 DIAGNOSIS — F32.A DEPRESSION, UNSPECIFIED DEPRESSION TYPE: ICD-10-CM

## 2023-03-24 DIAGNOSIS — D53.1 MEGALOBLASTIC ANEMIA: ICD-10-CM

## 2023-03-24 DIAGNOSIS — E55.9 VITAMIN D DEFICIENCY: ICD-10-CM

## 2023-03-24 DIAGNOSIS — E78.2 MIXED HYPERLIPIDEMIA: ICD-10-CM

## 2023-03-24 DIAGNOSIS — R53.83 OTHER FATIGUE: ICD-10-CM

## 2023-03-24 DIAGNOSIS — R82.90 FOUL SMELLING URINE: ICD-10-CM

## 2023-03-24 DIAGNOSIS — K21.9 GASTROESOPHAGEAL REFLUX DISEASE WITHOUT ESOPHAGITIS: ICD-10-CM

## 2023-03-24 RX ORDER — FAMOTIDINE 40 MG/1
TABLET, FILM COATED ORAL
COMMUNITY
Start: 2023-03-09

## 2023-03-24 NOTE — PROGRESS NOTES
"    Office Note     Name: Amanda Le    : 1985     MRN: [id number]     Chief Complaint  Excessive Sweating    Subjective     History of Present Illness:  Amanda Le is a 37 y.o. female who presents today for excessive sweating x4 months.  Received a COVID-19 shot and a flu shot .,  And she has 4 periods within 2 months \"heavy \".  She has not been on the Zoloft forever.  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  Her \"pee\" has a foul-smelling odor about it she had a UTI a year ago.  She is tearful except that it will not go away    Review of Systems:   Review of Systems    Past Medical History:   Past Medical History:   Diagnosis Date   • High cholesterol 2016   • oral abscess     drained       Past Surgical History:   Past Surgical History:   Procedure Laterality Date   • CHOLECYSTECTOMY     • ESOPHAGEAL DILATATION  10/2019    spsms       Family History:   Family History   Problem Relation Age of Onset   • Diabetes Mother    • Coronary artery disease Mother    • Diabetes Sister    • Hypertension Brother    • Obesity Brother    • Diabetes Maternal Grandfather    • Alzheimer's disease Maternal Grandfather        Social History:   Social History     Socioeconomic History   • Marital status:    Tobacco Use   • Smoking status: Never   • Smokeless tobacco: Never   Vaping Use   • Vaping Use: Never used   Substance and Sexual Activity   • Alcohol use: Yes     Comment: rarely   • Drug use: Never       Immunizations:   Immunization History   Administered Date(s) Administered   • COVID-19 (MODERNA) 1st, 2nd, 3rd Dose Only 2021, 2021   • COVID-19 (MODERNA) BOOSTER 01/15/2022   • COVID-19 (PFIZER) BIVALENT BOOSTER 12+YRS 2023   • Td 2014        Medications:     Current Outpatient Medications:   •  Ajovy 225 MG/1.5ML solution auto-injector, , Disp: , Rfl:   •  atorvastatin (LIPITOR) 20 MG tablet, Take 1 tablet by mouth Daily., Disp: 90 " "tablet, Rfl: 3  •  clidinium-chlordiazePOXIDE (LIBRAX) 5-2.5 MG per capsule, Take 1 capsule by mouth 3 (Three) Times a Day With Meals., Disp: 270 capsule, Rfl: 3  •  esomeprazole (nexIUM) 40 MG capsule, Take 1 capsule by mouth Every Morning Before Breakfast., Disp: 90 capsule, Rfl: 3  •  famotidine (PEPCID) 40 MG tablet, , Disp: , Rfl:   •  rizatriptan MLT (MAXALT-MLT) 10 MG disintegrating tablet, Maxalt-MLT 10 mg disintegrating tablet  Take 1 tablet as needed by oral route., Disp: , Rfl:   •  sertraline (Zoloft) 50 MG tablet, Take 1 tablet by mouth Daily., Disp: 90 tablet, Rfl: 3    Allergies:   Allergies   Allergen Reactions   • Sulfamethoxazole Other (See Comments)     Erythema nodosum   • Trimethoprim Other (See Comments)     Erythema nodosum       Objective     Vital Signs  /84 (BP Location: Left arm, Patient Position: Sitting, Cuff Size: Adult)   Pulse 75   Resp 17   Ht 165.1 cm (65\")   Wt 110 kg (243 lb 7 oz)   SpO2 100%   BMI 40.51 kg/m²   Estimated body mass index is 40.51 kg/m² as calculated from the following:    Height as of this encounter: 165.1 cm (65\").    Weight as of this encounter: 110 kg (243 lb 7 oz).          Physical Exam  Vitals and nursing note reviewed.   Constitutional:       Appearance: Normal appearance. She is obese.   HENT:      Head: Normocephalic.      Right Ear: External ear normal.      Left Ear: External ear normal.      Nose: Nose normal.   Eyes:      Pupils: Pupils are equal, round, and reactive to light.   Cardiovascular:      Rate and Rhythm: Normal rate and regular rhythm.      Pulses: Normal pulses.      Heart sounds: Normal heart sounds.   Musculoskeletal:      Cervical back: Normal range of motion and neck supple.   Skin:     General: Skin is warm and dry.   Neurological:      Mental Status: She is alert.   Psychiatric:         Mood and Affect: Mood normal.          Procedures     Assessment and Plan     1. Sweating increase  I do not know if it is the Naprosyn " she can occasionally takes, the Zoloft she takes, or the UTI or the change of life    2. Depression, unspecified depression type  Controlled    3. Gastroesophageal reflux disease without esophagitis  Controlled    4. Mixed hyperlipidemia  We will be Jagdish  - Lipid Panel; Future    5. Vitamin D deficiency  Vitamin D she is on    6. Megaloblastic anemia  B12 complex is on  - Vitamin B12 & Folate; Future    7. Other fatigue  Will check on  - Comprehensive Metabolic Panel; Future  - CBC & Differential; Future  - TSH; Future    8. Hypokalemia  We will check on  - Magnesium; Future    9. Iron deficiency  Rl to be low on iron but she is pink  - Iron and TIBC; Future  - Ferritin; Future    10. Foul smelling urine  UTI rule out  - Urine Culture - Urine, Urine, Clean Catch; Future  - POC Urinalysis Dipstick, Automated       Follow Up  Return in about 6 months (around 9/24/2023).    [facility]  [address] [place] [id number] [phone]

## 2023-03-25 ENCOUNTER — CLINICAL SUPPORT (OUTPATIENT)
Dept: FAMILY MEDICINE CLINIC | Facility: CLINIC | Age: 38
End: 2023-03-25
Payer: COMMERCIAL

## 2023-03-25 ENCOUNTER — TELEPHONE (OUTPATIENT)
Dept: FAMILY MEDICINE CLINIC | Facility: CLINIC | Age: 38
End: 2023-03-25
Payer: COMMERCIAL

## 2023-03-25 DIAGNOSIS — R82.90 FOUL SMELLING URINE: Primary | ICD-10-CM

## 2023-03-25 LAB
ALBUMIN SERPL-MCNC: 4.5 G/DL (ref 3.8–4.8)
ALBUMIN/GLOB SERPL: 1.7 {RATIO} (ref 1.2–2.2)
ALP SERPL-CCNC: 82 IU/L (ref 44–121)
ALT SERPL-CCNC: 15 IU/L (ref 0–32)
AST SERPL-CCNC: 13 IU/L (ref 0–40)
BASOPHILS # BLD AUTO: 0.1 X10E3/UL (ref 0–0.2)
BASOPHILS NFR BLD AUTO: 1 %
BILIRUB BLD-MCNC: NEGATIVE MG/DL
BILIRUB SERPL-MCNC: 0.2 MG/DL (ref 0–1.2)
BUN SERPL-MCNC: 12 MG/DL (ref 6–20)
BUN/CREAT SERPL: 18 (ref 9–23)
CALCIUM SERPL-MCNC: 9.7 MG/DL (ref 8.7–10.2)
CHLORIDE SERPL-SCNC: 103 MMOL/L (ref 96–106)
CHOLEST SERPL-MCNC: 217 MG/DL (ref 100–199)
CLARITY, POC: ABNORMAL
CO2 SERPL-SCNC: 23 MMOL/L (ref 20–29)
COLOR UR: YELLOW
CREAT SERPL-MCNC: 0.67 MG/DL (ref 0.57–1)
EGFRCR SERPLBLD CKD-EPI 2021: 115 ML/MIN/1.73
EOSINOPHIL # BLD AUTO: 0.3 X10E3/UL (ref 0–0.4)
EOSINOPHIL NFR BLD AUTO: 4 %
ERYTHROCYTE [DISTWIDTH] IN BLOOD BY AUTOMATED COUNT: 13.8 % (ref 11.7–15.4)
EXPIRATION DATE: ABNORMAL
FERRITIN SERPL-MCNC: 43 NG/ML (ref 15–150)
FOLATE SERPL-MCNC: 11.2 NG/ML
GLOBULIN SER CALC-MCNC: 2.7 G/DL (ref 1.5–4.5)
GLUCOSE SERPL-MCNC: 82 MG/DL (ref 70–99)
GLUCOSE UR STRIP-MCNC: NEGATIVE MG/DL
HCT VFR BLD AUTO: 37.4 % (ref 34–46.6)
HDLC SERPL-MCNC: 42 MG/DL
HGB BLD-MCNC: 12.7 G/DL (ref 11.1–15.9)
IMM GRANULOCYTES # BLD AUTO: 0 X10E3/UL (ref 0–0.1)
IMM GRANULOCYTES NFR BLD AUTO: 0 %
IRON SATN MFR SERPL: 12 % (ref 15–55)
IRON SERPL-MCNC: 43 UG/DL (ref 27–159)
KETONES UR QL: NEGATIVE
LDLC SERPL CALC-MCNC: 108 MG/DL (ref 0–99)
LEUKOCYTE EST, POC: ABNORMAL
LYMPHOCYTES # BLD AUTO: 2.5 X10E3/UL (ref 0.7–3.1)
LYMPHOCYTES NFR BLD AUTO: 29 %
Lab: ABNORMAL
MCH RBC QN AUTO: 30 PG (ref 26.6–33)
MCHC RBC AUTO-ENTMCNC: 34 G/DL (ref 31.5–35.7)
MCV RBC AUTO: 88 FL (ref 79–97)
MONOCYTES # BLD AUTO: 0.6 X10E3/UL (ref 0.1–0.9)
MONOCYTES NFR BLD AUTO: 6 %
NEUTROPHILS # BLD AUTO: 5.4 X10E3/UL (ref 1.4–7)
NEUTROPHILS NFR BLD AUTO: 60 %
NITRITE UR-MCNC: NEGATIVE MG/ML
PH UR: 6.5 [PH] (ref 5–8)
PLATELET # BLD AUTO: 267 X10E3/UL (ref 150–450)
POTASSIUM SERPL-SCNC: 4.1 MMOL/L (ref 3.5–5.2)
PROT SERPL-MCNC: 7.2 G/DL (ref 6–8.5)
PROT UR STRIP-MCNC: NEGATIVE MG/DL
RBC # BLD AUTO: 4.24 X10E6/UL (ref 3.77–5.28)
RBC # UR STRIP: NEGATIVE /UL
SODIUM SERPL-SCNC: 141 MMOL/L (ref 134–144)
SP GR UR: 1.03 (ref 1–1.03)
TIBC SERPL-MCNC: 351 UG/DL (ref 250–450)
TRIGL SERPL-MCNC: 389 MG/DL (ref 0–149)
UIBC SERPL-MCNC: 308 UG/DL (ref 131–425)
UROBILINOGEN UR QL: ABNORMAL
VIT B12 SERPL-MCNC: 610 PG/ML (ref 232–1245)
VLDLC SERPL CALC-MCNC: 67 MG/DL (ref 5–40)
WBC # BLD AUTO: 8.9 X10E3/UL (ref 3.4–10.8)

## 2023-03-25 NOTE — TELEPHONE ENCOUNTER
Pt stopped in to leave another UA, also updated pharmacy to [facility] for local pick ups and same day meds, [facility] for other meds.

## 2023-03-26 LAB
MAGNESIUM SERPL-MCNC: 2.1 MG/DL (ref 1.6–2.3)
TSH SERPL DL<=0.005 MIU/L-ACNC: 2.44 UIU/ML (ref 0.45–4.5)

## 2023-03-27 DIAGNOSIS — K21.9 GASTROESOPHAGEAL REFLUX DISEASE WITHOUT ESOPHAGITIS: ICD-10-CM

## 2023-03-27 RX ORDER — CHLORDIAZEPOXIDE HYDROCHLORIDE AND CLIDINIUM BROMIDE 5; 2.5 MG/1; MG/1
CAPSULE ORAL
Qty: 270 CAPSULE | Refills: 3 | Status: SHIPPED | OUTPATIENT
Start: 2023-03-27

## 2023-03-29 RX ORDER — CIPROFLOXACIN 250 MG/1
250 TABLET, FILM COATED ORAL 2 TIMES DAILY
Qty: 20 TABLET | Refills: 0 | Status: SHIPPED | OUTPATIENT
Start: 2023-03-29

## 2023-04-02 LAB
BACTERIA UR CULT: ABNORMAL
OTHER ANTIBIOTIC SUSC ISLT: ABNORMAL

## 2023-04-02 RX ORDER — TETRACYCLINE HYDROCHLORIDE 500 MG/1
500 CAPSULE ORAL 3 TIMES DAILY
Qty: 30 CAPSULE | Refills: 0 | Status: SHIPPED | OUTPATIENT
Start: 2023-04-02

## 2023-04-07 DIAGNOSIS — K21.9 GASTROESOPHAGEAL REFLUX DISEASE WITHOUT ESOPHAGITIS: ICD-10-CM

## 2023-04-10 DIAGNOSIS — K21.9 GASTROESOPHAGEAL REFLUX DISEASE WITHOUT ESOPHAGITIS: ICD-10-CM

## 2023-04-10 RX ORDER — ESOMEPRAZOLE MAGNESIUM 40 MG/1
40 CAPSULE, DELAYED RELEASE ORAL
Qty: 90 CAPSULE | Refills: 3 | Status: SHIPPED | OUTPATIENT
Start: 2023-04-10 | End: 2023-04-11 | Stop reason: SDUPTHER

## 2023-04-10 RX ORDER — ESOMEPRAZOLE MAGNESIUM 40 MG/1
CAPSULE, DELAYED RELEASE ORAL
Qty: 90 CAPSULE | Refills: 3 | OUTPATIENT
Start: 2023-04-10

## 2023-04-21 ENCOUNTER — TELEPHONE (OUTPATIENT)
Dept: FAMILY MEDICINE CLINIC | Facility: CLINIC | Age: 38
End: 2023-04-21
Payer: COMMERCIAL

## 2023-04-21 ENCOUNTER — PATIENT MESSAGE (OUTPATIENT)
Dept: FAMILY MEDICINE CLINIC | Facility: CLINIC | Age: 38
End: 2023-04-21
Payer: COMMERCIAL

## 2023-04-21 RX ORDER — FLUCONAZOLE 150 MG/1
150 TABLET ORAL ONCE
Qty: 2 TABLET | Refills: 1 | Status: SHIPPED | OUTPATIENT
Start: 2023-04-21 | End: 2023-04-21

## 2023-04-21 NOTE — TELEPHONE ENCOUNTER
Caller: Amanda Le    Relationship: Self    Best call back number: [phone]    What medication are you requesting: DIFLUCAN    What are your current symptoms: YEAST INFECTION    How long have you been experiencing symptoms: 3-4 DAYS    Have you had these symptoms before:    [x] Yes  [] No    Have you been treated for these symptoms before:   [x] Yes  [] No    If a prescription is needed, what is your preferred pharmacy and phone number: [facility] [address] [place] [address] [phone] [place] [phone]      Additional notes: THE PATIENT STATES THAT SHE WAS RECENTLY ON ANTIBIOTICS AND WOULD LIKE DIFLUCAN CALLED IN.     THANK YOU.

## 2023-04-21 NOTE — TELEPHONE ENCOUNTER
Caller: Rosmery Lenifer    Relationship: Self    Best call back number: [phone]    Caller requesting test results: YES    What test was performed: LABS    When was the test performed: 3.24.23    Where was the test performed: IN OFFICE    Additional notes: THE PATIENT WOULD LIKE A CALL BACK TO FURTHER DISCUSS HER LAB RESULTS.     PLEASE CALL AS SOON AS POSSIBLE.     THANK YOU.

## 2023-04-21 NOTE — TELEPHONE ENCOUNTER
Talked with patient, she thought she was going to get hormone levels tested for the sweating like estridol levels, etc. Can we place an order for this so she can have drawn?

## 2023-04-25 DIAGNOSIS — K21.9 GASTROESOPHAGEAL REFLUX DISEASE WITHOUT ESOPHAGITIS: ICD-10-CM

## 2023-04-25 RX ORDER — ESOMEPRAZOLE MAGNESIUM 40 MG/1
40 CAPSULE, DELAYED RELEASE ORAL
Qty: 90 CAPSULE | Refills: 3 | OUTPATIENT
Start: 2023-04-25

## 2023-04-25 NOTE — TELEPHONE ENCOUNTER
Caller: [facility] - [place], [place] - [address] [place] [address] [phone] [facility] [phone] [facility]    Relationship: Pharmacy        Requested Prescriptions:   Requested Prescriptions     Pending Prescriptions Disp Refills   • sertraline (Zoloft) 50 MG tablet 90 tablet 3     Sig: Take 1 tablet by mouth Daily.   • esomeprazole (nexIUM) 40 MG capsule 90 capsule 3     Sig: Take 1 capsule by mouth Every Morning Before Breakfast.        Pharmacy where request should be sent:  [place] [id number] [facility] [phone] [facility]        Last office visit with prescribing clinician: 3/24/2023   Last telemedicine visit with prescribing clinician: Visit date not found   Next office visit with prescribing clinician: Visit date not found       Does the patient have less than a 3 day supply:  [x] Yes  [] No    Would you like a call back once the refill request has been completed: [x] Yes [] No    If the office needs to give you a call back, can they leave a voicemail: [x] Yes [] No    Marcos William Rep   04/25/23 11:28 EDT

## 2023-09-19 RX ORDER — FREMANEZUMAB-VFRM 225 MG/1.5ML
INJECTION SUBCUTANEOUS
Qty: 6 ML | Refills: 0 | Status: SHIPPED | OUTPATIENT
Start: 2023-09-19

## 2023-10-12 ENCOUNTER — E-VISIT (OUTPATIENT)
Dept: FAMILY MEDICINE CLINIC | Facility: TELEHEALTH | Age: 38
End: 2023-10-12

## 2023-10-12 PROCEDURE — BRIGHTMDVISIT: Performed by: NURSE PRACTITIONER

## 2023-10-12 NOTE — EXTERNAL PATIENT INSTRUCTIONS
Diagnosis   Urinary tract infection (UTI)   My name is ABHIJIT Veloz. I'm a healthcare provider at [facility]. After reviewing your interview, I see you have a urinary tract infection (UTI).   Medications   Your pharmacy   [facility] [address] [place] [id number] [phone]     Prescription   Amoxicillin-clavulanate (500/125mg): Take 1 tablet by mouth twice a day for 7 days for infection. This medication is an antibiotic. Take it exactly as directed. You must finish the entire course of medication, even if you feel better after taking the first few doses.   Fluconazole (150mg): Take 1 tablet by mouth once for 1 day as a single dose. Use this medication only if you develop a yeast infection. If yeast infection symptoms are still present 3 days after taking the first tablet, take the second tablet.    Because you've developed yeast infections after taking antibiotics in the past, I've prescribed Diflucan (fluconazole). Diflucan is an oral antifungal that treats yeast infections. Use this medication only if you develop a yeast infection.   About your diagnosis   A UTI is an infection of one or more parts of the urinary tract, most commonly the bladder.   Most UTIs are caused by bacteria (usually E. coli) that travel up the urethra and into the bladder. I see that you have some common signs and symptoms of a UTI:    Pain or burning while urinating    Symptoms that feel a lot like past UTIs    Mild or moderate pain, pressure, or discomfort in your lower abdomen    Mild back pain    Cloudy urine    Strange or strong smelling urine    Symptoms that began shortly after sexual intercourse   Fortunately, most UTIs aren't serious, and they're easily treated with antibiotics. Make sure you take all of the antibiotic pills given to you, even if you start to feel better after the first few doses. Otherwise, the UTI might come back.   What to expect   If you follow this treatment plan,  you should start to feel better within 1 to 2 days.   When to seek care   Call us at [phone]   with any sudden or unexpected symptoms.    Symptoms that don't improve or get worse in the next 48 hours    Fever that goes above 101F or lasts longer than 24 hours    Shaking or chills    Nausea or vomiting    Severe flank pain (pain in your back or side) or pain that gets worse   Other treatment    Rest and drink plenty of water    Urinate frequently and when you first feel the urge    Place a heating pad on your back or stomach to help relieve some of the discomfort   Prevention    Drink a lot of liquids to help flush bacteria from your system. Water is best. Try for six to eight, 8-ounce glasses a day on a regular basis.    Urinate often and when you first feel the urge. Bacteria can grow when urine stays in the bladder too long. Urinate after sex to flush away bacteria.    After using the toilet, always wipe from front to back. This step is most important after a bowel movement. Wiping from front to back prevents bacteria normally found in stool from entering the urinary tract.   Your provider   Your diagnosis was provided by ABHIJIT Veloz, a member of your trusted care team at [facility].   If you have any questions, call us at [phone]  .

## 2023-10-12 NOTE — E-VISIT TREATED
Chief Complaint: Bladder infection (UTI)   Patient introduction   Patient is 38-year-old female. Patient provided the following organ inventory: Presence of a vagina, ovaries, a uterus, and breasts.   Patient has had dysuria for more than 10 days.   Urine is cloudy with a strong or pungent odor.   General presentation   Patient has not had a fever. No nausea or vomiting.   Mild abdominal or pelvic pain.   Mild back pain.   Pain in right flank.   Has not tried acetaminophen, ibuprofen, or phenazopyridine for current symptoms.   Previous history of UTI. Current symptoms feel exactly the same as previous UTIs. Received treatment for UTI 1 to 3 times in last year. Patient's last use of antibiotics for UTI was more than 3 months ago.   Previously developed yeast infections as a result of taking antibiotics for past UTIs.   No history of pyelonephritis. No history of kidney stones.   Had sexual intercourse in the past week. Does not use diaphragm. No unprotected sexual intercourse with a new partner in the last 2 weeks. Has not been exposed to sexually transmitted infections in the last month.   No recent history of cycling, motorcycling, or riding horseback. Does not wear tight-fitting pants/undergarments. No new soap, lotion, or cosmetics.   Patient is not being treated for diabetes mellitus.   Review of red flags/alarm symptoms:    No recent hospitalizations or nursing home care (last 3 months)    No history of renal failure    No recent history of urologic instrumentation    No anatomic abnormalities of the urinary tract    No abnormal vaginal discharge    No visible vaginal sores    No pain with sexual intercourse    No abnormal vaginal bleeding or spotting   Pregnancy/menstrual status/breastfeeding:   Patient is not pregnant. Patient is not breastfeeding. Regarding date of last menstrual period, patient writes: September 12.   Current medications   Currently taking rizatriptan MLT 10 MG disintegrating tablet,  esomeprazole 40 MG capsule, clidinium-chlordiazePOXIDE 5-2.5 MG per capsule, atorvastatin 20 MG tablet, sertraline 50 MG tablet, Ajovy 225 MG/1.5ML solution auto-injector, Vitamin B, Feosol, DayQuil, Flax Seed Oil, Vitamin D, Tretinoin topical, and Azelaic Acid.   Medication allergies    Nitrofurantoin    Sulfa drugs   Medication contraindication review   Not taking ACE inhibitors and ARBs.   No history of Lennox-Danlos syndrome, folate deficiency, G6PD deficiency, high blood pressure, aortic aneurysm or dissection, Marfan syndrome, megaloblastic anemia, mononucleosis, myasthenia gravis, oliguria/anuria, or peripheral vascular disease.   No known history of amoxicillin-clavulanate-associated cholestatic jaundice or nitrofurantoin-associated cholestatic jaundice.   Past medical history   Immune conditions: Regarding an autoimmune disorder they have, patient writes: Lichen Sclerosus. Patient takes medications regularly for their condition(s).   No history of cancer.   Patient-submitted comments   Patient was asked if they had anything to add about their symptoms. Patient writes: I was treated for a UTI earlier this year, and needed two rounds of antibiotics. The e. Coli was resistant to cipro. The results are in my [facility] chart. .   Patient did not request an excuse note.   Assessment   Uncomplicated acute UTI.   This is the likely diagnosis based on patient's interview responses, including:    Symptom profile    Previous history of UTI    Current symptoms are exactly the same as previous UTIs    Recent history of delaying urination   No recent history of kidney stones.   Plan   Medications:    amoxicillin 500 mg-potassium clavulanate 125 mg tablet /125mg 1 tab PO bid 7d for infection. This medication is an antibiotic. Take it exactly as directed. You must finish the entire course of medication, even if you feel better after taking the first few doses. Amount is 14 tab.    fluconazole 150 mg tablet RX 150mg 1  tab PO once 1d as a single dose for vaginal yeast infection. Repeat in 72 hours if symptoms persist. Amount is 2 tab.   The patient's prescriptions will be sent to:   [facility] [id number] [id number]   Phone: [phone]     Fax: [phone]   Education:    Condition and causes    Prevention    Treatment and self-care    When to call provider   Follow-up:   Patient to follow up as needed for progression or lack of improvement in symptoms within 3d.   ----------   Electronically signed by ABHIJIT Veloz on 2023-10-12 at 10:50AM   ----------   Patient Interview Transcript:   Knowing about your anatomy is important for diagnosing and treating UTIs. The gender we have on file for you is female, but we realize that this might not tell the whole story. Would you like to tell us more about your anatomy?    Yes   Not selected:    No   OK, which of these do you have? Select all that apply.    Vagina    Ovaries    Uterus    Breasts   Not selected:    Penis    Testes    Prostate   Which of these symptoms do you have? Select all that apply.    Pain or burning while urinating   Not selected:    Frequent urination    Sudden urge to urinate and it's hard to hold the urine in   How long have you had these symptoms? Select one.    More than 10 days   Not selected:    Less than 24 hours    1 to 3 days    4 to 6 days    7 to 10 days   Since your current symptoms started, has it been difficult to start, stop, or delay urination? Select one.    No   Not selected:    Yes   What color is your urine? Select one.    Cloudy   Not selected:    Clear    Yellow    Pink or red   Does your urine smell strange (like ammonia) or stronger than usual? Select one.    Yes   Not selected:    No   Do you also have any of these symptoms? Select all that apply.    Pain, pressure, or discomfort in the lower abdomen    Back pain   Not selected:    Fever    Nausea    Vomiting    No   How would you describe your  lower abdominal pain, pressure, or discomfort? Select one.    Mild; I only notice it when I pay attention to it   Not selected:    Moderate; it's uncomfortable and gets in the way of doing daily tasks    Severe; I can't get comfortable, and it stops me from doing daily tasks   How would you describe your back pain? Select one.    Mild, achy pain   Not selected:    Moderate pain that gets in the way of my daily tasks    Severe, sharp pain that keeps me from my daily tasks   Do you have any flank pain? The flank is the side of the body between the ribs and the hips.    Yes, in my right flank   Not selected:    Yes, in my left flank    Yes, in both my left and right flanks    No   Do you have any of these vaginal symptoms? Select all that apply.    No   Not selected:    Abnormal vaginal itching    Unscheduled or abnormal vaginal bleeding or spotting    Pain during sex    Visible sores on the vagina    Abnormal vaginal discharge   In the past 2 weeks, have you had a medical device or instrument placed in your urinary tract? Examples include catheters, stents, and nephrostomy tubes. Select one.    No   Not selected:    Yes   Have you recently been hospitalized or been a resident of a nursing home or other long-term care facility? This doesn't include emergency room (ER) visits. Select one.    No   Not selected:    Yes, within the last 2 weeks    Yes, within the last 3 months   Have you ever had severe problems with your kidneys, such as kidney failure? Select one.    No, not that I recall   Not selected:    Yes   Kidney stones    No   Not selected:    Within the last year    More than a year ago   Kidney infection (pyelonephritis)    No   Not selected:    Within the last year    More than a year ago   Have you ever been diagnosed with any of these? Select all that apply.    No   Not selected:    Urinary reflux    Bladder diverticula    Single (or horseshoe) kidney    Duplicated urethra   Have you recently spent a lot of  time cycling, riding a motorcycle, or horseback riding? Select one.    No   Not selected:    Yes   Have you recently held your urine for a long time after you felt the urge to go? Select one.    Yes   Not selected:    No   Have you recently avoided eating or drinking so you wouldn't have the urge to urinate as often? Select one.    No   Not selected:    Yes   Have you recently worn any tight-fitting underwear, tights, leggings, pants, or jeans? Select one.    No   Not selected:    Yes   Have you recently used any new soaps, lotions, or cosmetics on your genital area? Select one.    No   Not selected:    Yes   Do you use a diaphragm? Select one.    No   Not selected:    Yes   Are you pregnant? Select one.    No   Not selected:    Yes   When was your last menstrual period? If you don't currently have periods or no longer have periods, please briefly explain.    September 12   Are you breastfeeding? Select one.    No   Not selected:    Yes   Have you had sexual intercourse in the past week? Recent sexual intercourse is a risk factor for urinary tract infections. Select one.    Yes   Not selected:    No   Have you been exposed to a sexually transmitted infection (STI or STD) in the last month? Examples include chlamydia, gonorrhea, trichomoniasis, and herpes. Select one.    No, not that I know of   Not selected:    Yes   Have you had unprotected sexual intercourse with a new partner in the last 2 weeks? Select one.    No   Not selected:    Yes   Have you traveled to any of these countries within the last 3 months? Recent travel to these countries may affect which medication we recommend for your symptoms. Select all that apply.    None of these   Not selected:    [place]    [place]    [place]    [place]   Have you ever had a urinary tract infection (UTI)? A UTI is often called a bladder infection or acute cystitis. Select one.    Yes   Not selected:    No, not that I know of   How much do your current symptoms feel like past  UTIs? Select one.    Exactly the same   Not selected:    Mostly the same    Somewhat the same    Totally different   In the past year, how many times have you taken antibiotics for a UTI? Select one.    1 to 3   Not selected:    0    4 or more   When did you last take antibiotics for a UTI? Select one.    More than 3 months ago   Not selected:    Within the last 3 months   Have you ever developed a yeast infection as a result of taking antibiotics? Select one.    Yes   Not selected:    No, not that I know of   UTIs may be more serious when other factors are present. Let's address those now. Are you being treated for type 1 or type 2 diabetes? Select one.    No   Not selected:    Yes   Do you have any of these conditions that can affect the immune system? Scroll to see all options. Select all that apply.    An autoimmune disorder not listed here (specify): Lichen Sclerosus   Not selected:    History of bone marrow transplant    Chronic kidney disease    Chronic liver disease (including cirrhosis)    HIV/AIDS    Inflammatory bowel disease (Crohn's disease or ulcerative colitis)    Lupus    Moderate to severe plaque psoriasis    Multiple sclerosis    Rheumatoid arthritis    Sickle cell anemia    Alpha or beta thalassemia    History of solid organ transplant (kidney, liver, or heart)    History of spleen removal    A condition requiring treatment with long-term use of oral steroids (such as prednisone, prednisolone, or dexamethasone) (specify)    None of these   Do you take medications for your condition? This includes oral and injectable medications that are taken daily, weekly, or monthly. Select one.    Yes, regularly   Not selected:    Yes, for flare-ups only    No   Have you ever been diagnosed with cancer? Select one.    No   Not selected:    Yes, I have cancer now    Yes, but I'm in remission   These last few questions will help us create the right treatment plan for you. Are you being treated for any of these  "conditions? Select all that apply.    No   Not selected:    Lennox-Danlos syndrome    Folate deficiency    G6PD deficiency    High blood pressure    History of aortic aneurysm or dissection    Marfan syndrome    Megaloblastic anemia    Mono (mononucleosis)    Myasthenia gravis    Oliguria or anuria    Peripheral vascular disease   Have you ever had jaundice as a result of taking amoxicillin-clavulanate (Augmentin) or nitrofurantoin (Macrobid)? Select all that apply.    No   Not selected:    Yes, from amoxicillin-clavulanate (Augmentin)    Yes, from nitrofurantoin (Macrobid, Macrodantin)   Are you taking any of these medications? Select all that apply.    No   Not selected:    An ACE inhibitor such as lisinopril, enalapril, captopril, or benazepril    An angiotensin II receptor blocker (ARB) such as candesartan, irbesartan, losartan, or valsartan   Are you still taking these medications listed in your medical record? If you're not taking any of these, click Next. Select all that apply.    rizatriptan MLT 10 MG disintegrating tablet    esomeprazole 40 MG capsule    clidinium-chlordiazePOXIDE 5-2.5 MG per capsule    atorvastatin 20 MG tablet    sertraline 50 MG tablet    Ajovy 225 MG/1.5ML solution auto-injector   Are you taking any other medications, vitamins, or supplements? Select one.    Yes   Not selected:    No   Have you ever had an allergic or bad reaction to any medication? Select one.    Yes   Not selected:    No   Have you had an allergic or bad reaction to any of these medications? Select all that apply.    Nitrofurantoin (brands include Furadantin, Macrobid and Macrodantin)    Sulfamethoxazole-trimethoprim (brands include Bactrim and Septra) or any other sulfa drug   Not selected:    Any antibiotic starting with \"cef-,\" such as cefazolin, cefdinir, cefuroxime, ceftriaxone, ceftazidime, cefepime, or cephalexin (brands include Fortaz and Keflex)    Any antibiotic ending with \"-floxacin,\" such as " "ciprofloxacin, gemifloxacin, levofloxacin, moxifloxacin, or ofloxacin (brands include Cipro, Factive, Floxin, and Levaquin)    Any antibiotic ending with \"-cillin,\" such as penicillin, amoxicillin, ampicillin, dicloxacillin, nafcillin, or piperacillin (brands include Augmentin, Unasyn, and Zosyn)    Fluconazole (brand name Diflucan), itraconazole, or terconazole    Trimethoprim (brand name Primsol)    No, not that I know of   Have you had an allergic or bad reaction to any of these medications? Select all that apply.    No   Not selected:    Acetaminophen (Tylenol)    Ibuprofen (Advil, Midol, Motrin)    Phenazopyridine (Azo, Baridium, Pyridium, Uricalm, Uristat)   Do you need a doctor's note? A doctor's note confirms that you received care today and states when you can return to school or work. It does not contain information about your diagnosis or treatment plan. Your provider will make the final decision on whether to give you a doctor's note. Doctor's notes CANNOT be backdated. Select one.    No   Not selected:    Today only (1 day)    Today and tomorrow (2 days)    3 days   Is there anything you'd like to add about your symptoms? Please limit your comments to the symptoms asked about in this interview. If you include comments about other concerns, your provider may recommend that you be seen in person.    I was treated for a UTI earlier this year, and needed two rounds of antibiotics. The e. Coli was resistant to cipro. The results are in my [facility] [place] chart.   ----------   Medical history   Medical history data does not currently exist for this patient.    "

## 2023-10-18 NOTE — PROGRESS NOTES
Answers for HPI/ROS submitted by the patient on 2022  What is the primary reason for your visit?: Back Pain  Chronicity: chronic  Onset: more than 1 year ago  Frequency: constantly  Progression since onset: waxing and waning  Pain location: sacro-iliac  Pain quality: aching, stabbing  Radiates to: right thigh  Pain - numeric: 4/10  Pain is: the same all the time  Aggravated by: bending, position, sitting, twisting  Stiffness is present: all day  bowel incontinence: No  headaches: No  leg pain: No  paresis: No  paresthesias: No  perianal numbness: No  tingling: No  Risk factors: obesity, poor posture        Office Note     Name: Amanda Le    : 1985     MRN: [id number]     Chief Complaint  Back Pain (Patient has been going to Physical Therapy since April.)    Subjective     History of Present Illness:  Amanda Le is a 37 y.o. female who presents today for continuing right hip pain for 8 to 9 months.  Neck feels okay.  Lumbar spine hurts.  It goes down the right thigh.  When I remarked to her MRI of the lumbar spine L1-L3 normal.  She get up it hurts, lies down it hurts, walking around it hurts.  Nothing showed up on her SI joint x-rays.    She been taking the Naprosyn, flaring her stomach up, although she gets relief from it.    Review of Systems:   Review of Systems   Constitutional: Negative for fever and unexpected weight loss.   Cardiovascular: Negative for chest pain.   Gastrointestinal: Negative for abdominal pain.   Genitourinary: Positive for pelvic pain. Negative for dysuria and urinary incontinence.   Neurological: Positive for weakness. Negative for numbness.       Past Medical History:   Past Medical History:   Diagnosis Date   • High cholesterol 2016   • oral abscess     drained       Past Surgical History:   Past Surgical History:   Procedure Laterality Date   • CHOLECYSTECTOMY  2017   • ESOPHAGEAL DILATATION  10/2019    spsms       Family History:   Family History  "  Problem Relation Age of Onset   • Diabetes Mother    • Coronary artery disease Mother    • Diabetes Sister    • Hypertension Brother    • Obesity Brother    • Diabetes Maternal Grandfather    • Alzheimer's disease Maternal Grandfather        Social History:   Social History     Socioeconomic History   • Marital status:    Tobacco Use   • Smoking status: Never   • Smokeless tobacco: Never   Vaping Use   • Vaping Use: Never used   Substance and Sexual Activity   • Alcohol use: Yes     Comment: rarely   • Drug use: Never       Immunizations:   Immunization History   Administered Date(s) Administered   • COVID-19 (MODERNA) 1st, 2nd, 3rd Dose Only 04/06/2021, 05/03/2021   • COVID-19 (MODERNA) BOOSTER 01/15/2022   • Td 08/01/2014        Medications:     Current Outpatient Medications:   •  Ajovy 225 MG/1.5ML solution auto-injector, , Disp: , Rfl:   •  atorvastatin (LIPITOR) 20 MG tablet, Take 1 tablet by mouth Daily., Disp: 90 tablet, Rfl: 3  •  clidinium-chlordiazePOXIDE (LIBRAX) 5-2.5 MG per capsule, Take 1 capsule by mouth 3 (Three) Times a Day With Meals., Disp: 270 capsule, Rfl: 3  •  esomeprazole (nexIUM) 40 MG capsule, Take 1 capsule by mouth Every Morning Before Breakfast., Disp: 90 capsule, Rfl: 3  •  rizatriptan MLT (MAXALT-MLT) 10 MG disintegrating tablet, Maxalt-MLT 10 mg disintegrating tablet  Take 1 tablet as needed by oral route., Disp: , Rfl:   •  sertraline (Zoloft) 50 MG tablet, Take 1 tablet by mouth Daily., Disp: 90 tablet, Rfl: 3    Current Facility-Administered Medications:   •  triamcinolone acetonide (KENALOG-40) injection 80 mg, 80 mg, Intramuscular, Once, Noah Manjarrez MD    Allergies:   Allergies   Allergen Reactions   • Sulfamethoxazole Other (See Comments)     Erythema nodosum   • Trimethoprim Other (See Comments)     Erythema nodosum       Objective     Vital Signs  /76   Pulse 67   Ht 165.1 cm (65\")   Wt 110 kg (242 lb)   SpO2 99%   BMI 40.27 kg/m²   Estimated body mass " "index is 40.27 kg/m² as calculated from the following:    Height as of this encounter: 165.1 cm (65\").    Weight as of this encounter: 110 kg (242 lb).          Physical Exam  Vitals and nursing note reviewed.   Constitutional:       Appearance: She is obese.   HENT:      Head: Normocephalic and atraumatic.      Right Ear: External ear normal.      Left Ear: External ear normal.      Nose: Nose normal.      Mouth/Throat:      Mouth: Mucous membranes are moist.   Musculoskeletal:         General: Tenderness present. Normal range of motion.        Legs:       Comments: Her hip pain.   Neurological:      Mental Status: She is alert.          Procedures     Assessment and Plan     1. Chronic hip pain, right  We opted for Kenalog 80 injection.  We get an MRI of the right hip.She'll return for her flu shot in a 2 week lay off period.  - triamcinolone acetonide (KENALOG-40) injection 80 mg    2. Esophageal dysphagia  She will take off the Naprosyn 10 days if the Kenalog works    3. Gastroesophageal reflux disease without esophagitis  Flared up again she last had is esophageal dilatation by [facility] doctors in February 2022       Follow Up  Return if symptoms worsen or fail to improve.    Noah OREILLY PC [facility]  [address] [address] [phone]  " Low Dose Naltrexone Pregnancy And Lactation Text: Naltrexone is pregnancy category C.  There have been no adequate and well-controlled studies in pregnant women.  It should be used in pregnancy only if the potential benefit justifies the potential risk to the fetus.   Limited data indicates that naltrexone is minimally excreted into breastmilk.

## 2023-12-31 ENCOUNTER — E-VISIT (OUTPATIENT)
Dept: FAMILY MEDICINE CLINIC | Facility: TELEHEALTH | Age: 38
End: 2023-12-31
Payer: COMMERCIAL

## 2023-12-31 NOTE — E-VISIT TREATED
Chief Complaint: Bladder infection (UTI)   Patient introduction   Patient is 38-year-old female.   Patient has had dysuria, frequent urination, and urinary urgency for 4 to 6 days.   Urine is cloudy with a strong or pungent odor.   Warning. The following may warrant further investigation:    Took TMP/SMX for UTI within the last 3 months   General presentation   Patient has not had a fever. No nausea or vomiting.   Moderate abdominal or pelvic pain.   Mild back pain.   No flank pain.   The following treatments were helpful for current symptoms:    Phenazopyridine   Previous history of UTI. Current symptoms feel exactly the same as previous UTIs. Received treatment for UTI 1 to 3 times in last year. Patient's last use of antibiotics for UTI was within the last 3 months.   The following antibiotic was helpful for their last UTI:    Amoxicillin-clavulanate   The following antibiotic was not helpful for their last UTI:    TMP/SMX   Previously developed yeast infections as a result of taking antibiotics for past UTIs.   No history of pyelonephritis. No history of kidney stones.   Had sexual intercourse in the past week. Does not use diaphragm. No unprotected sexual intercourse with a new partner in the last 2 weeks.   Patient is not being treated for diabetes mellitus.   Review of red flags/alarm symptoms:    No recent hospitalizations or nursing home care (last 3 months)    No history of renal failure    No recent history of urologic instrumentation    No anatomic abnormalities of the urinary tract    No abnormal vaginal discharge    No visible vaginal sores    No pain with sexual intercourse    No abnormal vaginal bleeding or spotting   Pregnancy/menstrual status/breastfeeding:   Patient is not pregnant. Patient is not breastfeeding. Regarding date of last menstrual period, patient writes: 12/10.   Current medications   Currently taking rizatriptan MLT 10 MG disintegrating tablet, famotidine 40 MG tablet,  "esomeprazole 40 MG capsule, clidinium-chlordiazePOXIDE 5-2.5 MG per capsule, atorvastatin 20 MG tablet, sertraline 50 MG tablet, Ajovy 225 MG/1.5ML solution auto-injector, Feosol, vitamin D, vitamin b, FLAXSEED, cannabidiol (CBD), and tetrahydrocannabinol (THC) extract.   Medication allergies    Nitrofurantoin    Sulfa drugs   Medication contraindication review   Not taking ACE inhibitors and ARBs.   Risk factors for antibiotic resistance: Used TMP/SMX within the last 3 months.   No history of Lennox-Danlos syndrome, folate deficiency, G6PD deficiency, high blood pressure, aortic aneurysm or dissection, Marfan syndrome, megaloblastic anemia, mononucleosis, myasthenia gravis, oliguria/anuria, or peripheral vascular disease.   No known history of amoxicillin-clavulanate-associated cholestatic jaundice or nitrofurantoin-associated cholestatic jaundice.   Past medical history   Immune conditions: Regarding an autoimmune disorder they have, patient writes: Lichen schlerosus. Patient takes medications regularly for their condition(s).   No history of cancer.   Patient-submitted comments   Patient was asked if they had anything to add about their symptoms. Patient writes: The symptoms are they same as other UTIs I've had. My urine smells more like sulfur, especially in the mornings, and there is constant \"pressure\" in my lower abdomen. The AZO has helped a little, but not much. .   Patient did not request an excuse note.   Assessment   Uncomplicated acute UTI.   This is the likely diagnosis based on patient's interview responses, including:    Symptom profile    Previous history of UTI    Current symptoms are exactly the same as previous UTIs   No recent history of kidney stones.   Plan   Medications:    amoxicillin 500 mg-potassium clavulanate 125 mg tablet /125mg 1 tab PO bid 7d for infection. This medication is an antibiotic. Take it exactly as directed. You must finish the entire course of medication, even if you " feel better after taking the first few doses. Amount is 14 tab.    fluconazole 150 mg tablet RX 150mg 1 tab PO once 1d as a single dose for vaginal yeast infection. Repeat in 72 hours if symptoms persist. Amount is 2 tab.   The patient's prescriptions will be sent to:   [facility] [id number] [facility] [address]   Phone: [phone]     Fax: [phone]   Education:    Condition and causes    Prevention    Treatment and self-care    When to call provider   Follow-up:   Patient to follow up as needed for progression or lack of improvement in symptoms within 3d.   ----------   Electronically signed by ABHIJIT Johnson on 2023-12-31 at 10:43AM   ----------   Patient Interview Transcript:   Knowing about your anatomy is important for diagnosing and treating UTIs. The gender we have on file for you is female, but we realize that this might not tell the whole story. Would you like to tell us more about your anatomy?    No   Not selected:    Yes   Which of these symptoms do you have? Select all that apply.    Pain or burning while urinating    Frequent urination    __Sudden urge to urinate and it's hard to hold the urine in __   How long have you had these symptoms? Select one.    4 to 6 days   Not selected:    Less than 24 hours    1 to 3 days    7 to 10 days    More than 10 days   Since your current symptoms started, has it been difficult to start, stop, or delay urination? Select one.    No   Not selected:    Yes   What color is your urine? Select one.    Cloudy   Not selected:    Clear    Yellow    Pink or red   Does your urine smell strange (like ammonia) or stronger than usual? Select one.    Yes   Not selected:    No   Do you also have any of these symptoms? Select all that apply.    Pain, pressure, or discomfort in the lower abdomen    Back pain   Not selected:    Fever    Nausea    Vomiting    No   How would you describe your lower abdominal pain, pressure, or discomfort? Select one.     Moderate; it's uncomfortable and gets in the way of doing daily tasks   Not selected:    Mild; I only notice it when I pay attention to it    Severe; I can't get comfortable, and it stops me from doing daily tasks   How would you describe your back pain? Select one.    Mild, achy pain   Not selected:    Moderate pain that gets in the way of my daily tasks    Severe, sharp pain that keeps me from my daily tasks   Do you have any flank pain? The flank is the side of the body between the ribs and the hips.    No   Not selected:    Yes, in my left flank    Yes, in my right flank    Yes, in both my left and right flanks   Do you have any of these vaginal symptoms? Select all that apply.    No   Not selected:    Abnormal vaginal itching    Unscheduled or abnormal vaginal bleeding or spotting    Pain during sex    Visible sores on the vagina    Abnormal vaginal discharge   In the past 2 weeks, have you had a medical device or instrument placed in your urinary tract? Examples include catheters, stents, and nephrostomy tubes. Select one.    No   Not selected:    Yes   Have you recently been hospitalized or been a resident of a nursing home or other long-term care facility? This doesn't include emergency room (ER) visits. Select one.    No   Not selected:    Yes, within the last 2 weeks    Yes, within the last 3 months   Have you ever had severe problems with your kidneys, such as kidney failure? Select one.    No, not that I recall   Not selected:    Yes   Kidney stones    No   Not selected:    Within the last year    More than a year ago   Kidney infection (pyelonephritis)    No   Not selected:    Within the last year    More than a year ago   Have you ever been diagnosed with any of these? Select all that apply.    No   Not selected:    Urinary reflux    Bladder diverticula    Single (or horseshoe) kidney    Duplicated urethra   Have you recently held your urine for a long time after you felt the urge to go? Select one.     No   Not selected:    Yes   Have you recently avoided eating or drinking so you wouldn't have the urge to urinate as often? Select one.    No   Not selected:    Yes   Do you use a diaphragm? Select one.    No   Not selected:    Yes   Are you pregnant? Select one.    No   Not selected:    Yes   When was your last menstrual period? If you don't currently have periods or no longer have periods, please briefly explain.    12/10   Are you breastfeeding? Select one.    No   Not selected:    Yes   Have you had sexual intercourse in the past week? Recent sexual intercourse is a risk factor for urinary tract infections. Select one.    Yes   Not selected:    No   Have you had unprotected sexual intercourse with a new partner in the last 2 weeks? Select one.    No   Not selected:    Yes   Have you traveled to any of these countries within the last 3 months? Recent travel to these countries may affect which medication we recommend for your symptoms. Select all that apply.    None of these   Not selected:    [place]    [place]    [place]    [place]   Phenazopyridine (Azo, Baridium, Pyridium, Uricalm, Uristat)    Helpful   Not selected:    Not helpful   Have you ever had a urinary tract infection (UTI)? A UTI is often called a bladder infection or acute cystitis. Select one.    Yes   Not selected:    No, not that I know of   How much do your current symptoms feel like past UTIs? Select one.    Exactly the same   Not selected:    Mostly the same    Somewhat the same    Totally different   In the past year, how many times have you taken antibiotics for a UTI? Select one.    1 to 3   Not selected:    0    4 or more   When did you last take antibiotics for a UTI? Select one.    Within the last 3 months   Not selected:    More than 3 months ago   Do you remember which antibiotic you took for your last UTI? Knowing which antibiotic you took helps your provider choose the best treatment for you. Select one.    Yes   Not selected:    No    Amoxicillin-clavulanate (Augmentin)    Helpful   Not selected:    Not helpful   Sulfamethoxazole/trimethoprim, also known as TMP/SMX (Bactrim, Bactrim DS)    Not helpful   Not selected:    Helpful   Have you ever developed a yeast infection as a result of taking antibiotics? Select one.    Yes   Not selected:    No, not that I know of   UTIs may be more serious when other factors are present. Let's address those now. Are you being treated for type 1 or type 2 diabetes? Select one.    No   Not selected:    Yes   Do you have any of these conditions that can affect the immune system? Scroll to see all options. Select all that apply.    An autoimmune disorder not listed here (specify): Lichen schlerosus   Not selected:    History of bone marrow transplant    Chronic kidney disease    Chronic liver disease (including cirrhosis)    HIV/AIDS    Inflammatory bowel disease (Crohn's disease or ulcerative colitis)    Lupus    Moderate to severe plaque psoriasis    Multiple sclerosis    Rheumatoid arthritis    Sickle cell anemia    Alpha or beta thalassemia    History of solid organ transplant (kidney, liver, or heart)    History of spleen removal    A condition requiring treatment with long-term use of oral steroids (such as prednisone, prednisolone, or dexamethasone) (specify)    None of these   Do you take medications for your condition? This includes oral and injectable medications that are taken daily, weekly, or monthly. Select one.    Yes, regularly   Not selected:    Yes, for flare-ups only    No   Have you ever been diagnosed with cancer? Select one.    No   Not selected:    Yes, I have cancer now    Yes, but I'm in remission   These last few questions will help us create the right treatment plan for you. Are you being treated for any of these conditions? Select all that apply.    No   Not selected:    Lennox-Danlos syndrome    Folate deficiency    G6PD deficiency    High blood pressure    History of aortic aneurysm or  "dissection    Marfan syndrome    Megaloblastic anemia    Mono (mononucleosis)    Myasthenia gravis    Oliguria or anuria    Peripheral vascular disease   Have you ever had jaundice as a result of taking amoxicillin-clavulanate (Augmentin) or nitrofurantoin (Macrobid)? Select all that apply.    No   Not selected:    Yes, from amoxicillin-clavulanate (Augmentin)    Yes, from nitrofurantoin (Macrobid, Macrodantin)   Are you taking any of these medications? Select all that apply.    No   Not selected:    An ACE inhibitor such as lisinopril, enalapril, captopril, or benazepril    An angiotensin II receptor blocker (ARB) such as candesartan, irbesartan, losartan, or valsartan   Are you still taking these medications listed in your medical record? If you're not taking any of these, click Next. Select all that apply.    rizatriptan MLT 10 MG disintegrating tablet    famotidine 40 MG tablet    esomeprazole 40 MG capsule    clidinium-chlordiazePOXIDE 5-2.5 MG per capsule    atorvastatin 20 MG tablet    sertraline 50 MG tablet    Ajovy 225 MG/1.5ML solution auto-injector   Are you taking any other medications, vitamins, or supplements? Select one.    Yes   Not selected:    No   Have you ever had an allergic or bad reaction to any medication? Select one.    Yes   Not selected:    No   Have you had an allergic or bad reaction to any of these medications? Select all that apply.    Nitrofurantoin (brands include Furadantin, Macrobid and Macrodantin)    Sulfamethoxazole-trimethoprim (brands include Bactrim and Septra) or any other sulfa drug   Not selected:    Any antibiotic starting with \"cef-,\" such as cefazolin, cefdinir, cefuroxime, ceftriaxone, ceftazidime, cefepime, or cephalexin (brands include Fortaz and Keflex)    Any antibiotic ending with \"-floxacin,\" such as ciprofloxacin, gemifloxacin, levofloxacin, moxifloxacin, or ofloxacin (brands include Cipro, Factive, Floxin, and Levaquin)    Any antibiotic ending with \"-cillin,\" " "such as penicillin, amoxicillin, ampicillin, dicloxacillin, nafcillin, or piperacillin (brands include Augmentin, Unasyn, and Zosyn)    Fluconazole (brand name Diflucan), itraconazole, or terconazole    Trimethoprim (brand name Primsol)    No, not that I know of   Have you had an allergic or bad reaction to any of these medications? Select all that apply.    No   Not selected:    Acetaminophen (Tylenol)    Ibuprofen (Advil, Midol, Motrin)    Phenazopyridine (Azo, Baridium, Pyridium, Uricalm, Uristat)   Do you need a doctor's note? A doctor's note confirms that you received care today and states when you can return to school or work. It does not contain information about your diagnosis or treatment plan. Your provider will make the final decision on whether to give you a doctor's note. Doctor's notes CANNOT be backdated. Select one.    No   Not selected:    Today only (1 day)    Today and tomorrow (2 days)    3 days   Is there anything you'd like to add about your symptoms? Please limit your comments to the symptoms asked about in this interview. If you include comments about other concerns, your provider may recommend that you be seen in person.    __The symptoms are they same as other UTIs I've had. My urine smells more like sulfur, especially in the mornings, and there is constant \"pressure\" in my lower abdomen. The AZO has helped a little, but not much. __   ----------   Medical history   Medical history data does not currently exist for this patient.    "

## 2024-03-18 DIAGNOSIS — K21.9 GASTROESOPHAGEAL REFLUX DISEASE WITHOUT ESOPHAGITIS: ICD-10-CM

## 2024-03-20 RX ORDER — CHLORDIAZEPOXIDE HYDROCHLORIDE AND CLIDINIUM BROMIDE 5; 2.5 MG/1; MG/1
CAPSULE ORAL
Qty: 270 CAPSULE | Refills: 3 | OUTPATIENT
Start: 2024-03-20

## 2024-03-29 DIAGNOSIS — K21.9 GASTROESOPHAGEAL REFLUX DISEASE WITHOUT ESOPHAGITIS: ICD-10-CM

## 2024-03-29 RX ORDER — CHLORDIAZEPOXIDE HYDROCHLORIDE AND CLIDINIUM BROMIDE 5; 2.5 MG/1; MG/1
1 CAPSULE ORAL
Qty: 270 CAPSULE | Refills: 1 | Status: SHIPPED | OUTPATIENT
Start: 2024-03-29

## 2024-03-29 NOTE — TELEPHONE ENCOUNTER
Caller: [facility] - [address] - [phone] [facility] [phone] FX    Relationship: Pharmacy    Best call back number: [phone]    Requested Prescriptions:   Requested Prescriptions     Pending Prescriptions Disp Refills    clidinium-chlordiazePOXIDE (LIBRAX) 5-2.5 MG per capsule 270 capsule 1     Sig: Take 1 capsule by mouth 3 (Three) Times a Day With Meals.        Pharmacy where request should be sent: [facility] - [facility] [address] [facility] - [phone] [facility] [phone] FX     Last office visit with prescribing clinician: 3/24/2023   Last telemedicine visit with prescribing clinician: Visit date not found   Next office visit with prescribing clinician: 4/19/2024     Additional details provided by patient:     Does the patient have less than a 3 day supply:  [] Yes  [x] No    Would you like a call back once the refill request has been completed: [] Yes [x] No    If the office needs to give you a call back, can they leave a voicemail: [] Yes [x] No    Marcos Robb Rep   03/29/24 14:19 EDT

## 2024-04-19 ENCOUNTER — OFFICE VISIT (OUTPATIENT)
Dept: FAMILY MEDICINE CLINIC | Facility: CLINIC | Age: 39
End: 2024-04-19
Payer: COMMERCIAL

## 2024-04-19 VITALS
HEART RATE: 67 BPM | SYSTOLIC BLOOD PRESSURE: 118 MMHG | WEIGHT: 229 LBS | BODY MASS INDEX: 38.15 KG/M2 | DIASTOLIC BLOOD PRESSURE: 82 MMHG | OXYGEN SATURATION: 99 % | HEIGHT: 65 IN

## 2024-04-19 DIAGNOSIS — E28.2 PCOS (POLYCYSTIC OVARIAN SYNDROME): Primary | ICD-10-CM

## 2024-04-19 DIAGNOSIS — Z00.00 PHYSICAL EXAM: ICD-10-CM

## 2024-04-19 DIAGNOSIS — E78.2 MIXED HYPERLIPIDEMIA: ICD-10-CM

## 2024-04-19 DIAGNOSIS — E55.9 VITAMIN D DEFICIENCY: ICD-10-CM

## 2024-04-19 DIAGNOSIS — D53.1 MEGALOBLASTIC ANEMIA: ICD-10-CM

## 2024-04-19 DIAGNOSIS — K21.9 GASTROESOPHAGEAL REFLUX DISEASE WITHOUT ESOPHAGITIS: ICD-10-CM

## 2024-04-19 DIAGNOSIS — R53.83 OTHER FATIGUE: ICD-10-CM

## 2024-04-19 DIAGNOSIS — D50.9 IRON DEFICIENCY ANEMIA, UNSPECIFIED IRON DEFICIENCY ANEMIA TYPE: ICD-10-CM

## 2024-04-19 RX ORDER — ATORVASTATIN CALCIUM 20 MG/1
20 TABLET, FILM COATED ORAL DAILY
Qty: 90 TABLET | Refills: 3 | Status: SHIPPED | OUTPATIENT
Start: 2024-04-19 | End: 2024-04-21

## 2024-04-19 RX ORDER — BUPROPION HYDROCHLORIDE 150 MG/1
150 TABLET ORAL DAILY
Qty: 90 TABLET | Refills: 0 | Status: SHIPPED | OUTPATIENT
Start: 2024-04-19

## 2024-04-19 RX ORDER — ESOMEPRAZOLE MAGNESIUM 40 MG/1
40 CAPSULE, DELAYED RELEASE ORAL
Qty: 90 CAPSULE | Refills: 3 | Status: SHIPPED | OUTPATIENT
Start: 2024-04-19

## 2024-04-19 NOTE — PROGRESS NOTES
Office Note     Name: Amanda Le    : 1985     MRN: [id number]     Chief Complaint  Annual Exam (Physical. Pt fasting.)    Subjective     History of Present Illness:  Amanda Le is a 38 y.o. female who presents today for annual exam.  Found out she is got the PCOS.  No polycystic ovary?  Did some hormone replacement studies.  Been on metformin advised her to take 2 a day, twice daily.  Anticipatory guidance counseling offered: Safety, diet exercise sleep, substance abuse, injury prevention, disease disease prevention, dental health.  She is getting used to the depression on 50 of Zoloft I offered her Wellbutrin  mg every morning take with the Zoloft    Review of Systems:   Review of Systems    Past Medical History:   Past Medical History:   Diagnosis Date    High cholesterol 2016    Lichen sclerosus     oral abscess     drained    PCOS (polycystic ovarian syndrome)        Past Surgical History:   Past Surgical History:   Procedure Laterality Date    CHOLECYSTECTOMY  2017    ESOPHAGEAL DILATATION  10/2019    spsms       Family History:   Family History   Problem Relation Age of Onset    Diabetes Mother     Coronary artery disease Mother     Diabetes Sister     Hypertension Brother     Obesity Brother     Diabetes Maternal Grandfather     Alzheimer's disease Maternal Grandfather        Social History:   Social History     Socioeconomic History    Marital status:    Tobacco Use    Smoking status: Never     Passive exposure: Never    Smokeless tobacco: Never   Vaping Use    Vaping status: Never Used   Substance and Sexual Activity    Alcohol use: Yes     Comment: rarely    Drug use: Never       Immunizations:   Immunization History   Administered Date(s) Administered    COVID-19 (MODERNA) 1st,2nd,3rd Dose Monovalent 2021, 2021    COVID-19 (MODERNA) Monovalent Original Booster 01/15/2022    COVID-19 (PFIZER) BIVALENT 12+YRS 2023    Flublok 18+yrs  "01/02/2023    Influenza, Unspecified 01/01/2023    Td (TDVAX) 08/01/2014        Medications:     Current Outpatient Medications:     atorvastatin (LIPITOR) 20 MG tablet, Take 1 tablet by mouth Daily., Disp: 90 tablet, Rfl: 3    clidinium-chlordiazePOXIDE (LIBRAX) 5-2.5 MG per capsule, Take 1 capsule by mouth 3 (Three) Times a Day With Meals., Disp: 270 capsule, Rfl: 1    esomeprazole (nexIUM) 40 MG capsule, Take 1 capsule by mouth Every Morning Before Breakfast., Disp: 90 capsule, Rfl: 3    famotidine (PEPCID) 40 MG tablet, , Disp: , Rfl:     Fremanezumab-vfrm (Ajovy) 225 MG/1.5ML solution auto-injector, INJECT 1.5ML UNDER THE SKIN EVERY MONTH , Disp: 6 mL, Rfl: 0    metFORMIN (GLUCOPHAGE) 500 MG tablet, Take 1 tablet by mouth Daily With Breakfast., Disp: , Rfl:     rizatriptan MLT (MAXALT-MLT) 10 MG disintegrating tablet, Maxalt-MLT 10 mg disintegrating tablet  Take 1 tablet as needed by oral route., Disp: , Rfl:     sertraline (Zoloft) 50 MG tablet, Take 1 tablet by mouth Daily., Disp: 90 tablet, Rfl: 3    buPROPion XL (Wellbutrin XL) 150 MG 24 hr tablet, Take 1 tablet by mouth Daily., Disp: 90 tablet, Rfl: 0    Allergies:   Allergies   Allergen Reactions    Sulfamethoxazole Other (See Comments)     Erythema nodosum    Sulfamethoxazole-Trimethoprim Anaphylaxis, Anxiety, Dermatitis, Hives, Itching, Nausea Only, Palpitations, Rash and Swelling    Trimethoprim Other (See Comments)     Erythema nodosum    Topiramate Other (See Comments)       Objective     Vital Signs  /82   Pulse 67   Ht 165.1 cm (65\")   Wt 104 kg (229 lb)   SpO2 99%   BMI 38.11 kg/m²   Estimated body mass index is 38.11 kg/m² as calculated from the following:    Height as of this encounter: 165.1 cm (65\").    Weight as of this encounter: 104 kg (229 lb).          Physical Exam  Vitals and nursing note reviewed.   Constitutional:       General: She is not in acute distress.     Appearance: Normal appearance. She is obese. She is not " diaphoretic.   HENT:      Head: Normocephalic and atraumatic.      Right Ear: Tympanic membrane, ear canal and external ear normal.      Left Ear: Tympanic membrane, ear canal and external ear normal.      Mouth/Throat:      Mouth: Mucous membranes are moist. Mucous membranes are dry.   Eyes:      Extraocular Movements: Extraocular movements intact.      Pupils: Pupils are equal, round, and reactive to light.   Cardiovascular:      Rate and Rhythm: Normal rate and regular rhythm.   Pulmonary:      Effort: Pulmonary effort is normal. No respiratory distress.      Breath sounds: Normal breath sounds. No wheezing or rales.   Abdominal:      General: Abdomen is flat. Bowel sounds are normal.      Palpations: Abdomen is soft. There is no mass.      Tenderness: There is no guarding or rebound.   Musculoskeletal:         General: Normal range of motion.      Cervical back: Normal range of motion and neck supple.      Right lower leg: No edema.      Left lower leg: No edema.   Skin:     General: Skin is warm and dry.   Neurological:      General: No focal deficit present.      Mental Status: She is alert and oriented to person, place, and time.      Motor: No weakness.   Psychiatric:         Mood and Affect: Mood normal.          Procedures     Assessment and Plan     1. Physical exam  Becoming more exercises she joined a gym    2. Mixed hyperlipidemia  She is fasting  - Lipid panel  - atorvastatin (LIPITOR) 20 MG tablet; Take 1 tablet by mouth Daily.  Dispense: 90 tablet; Refill: 3    3. Gastroesophageal reflux disease without esophagitis  Nexium on hold if she takes the Pepcid 40  - esomeprazole (nexIUM) 40 MG capsule; Take 1 capsule by mouth Every Morning Before Breakfast.  Dispense: 90 capsule; Refill: 3    4. PCOS (polycystic ovarian syndrome)  Diagnosed by OB/GYN  - Hemoglobin A1c    5. Iron deficiency anemia, unspecified iron deficiency anemia type  Is taking the Feosol  - Iron and TIBC  - Ferritin    6.  Megaloblastic anemia  Not on this  - Vitamin B12 & Folate    7. Vitamin D deficiency  Not on this  - Vitamin D,25-Hydroxy    8. Other fatigue    - Comprehensive Metabolic Panel  - CBC & Differential  - TSH       Follow Up  Return in about 1 year (around 4/19/2025).    [facility]  [address] [facility] [id number] [phone]

## 2024-04-20 LAB
25(OH)D3+25(OH)D2 SERPL-MCNC: 45.8 NG/ML (ref 30–100)
ALBUMIN SERPL-MCNC: 4.6 G/DL (ref 3.9–4.9)
ALBUMIN/GLOB SERPL: 1.6 {RATIO} (ref 1.2–2.2)
ALP SERPL-CCNC: 98 IU/L (ref 44–121)
ALT SERPL-CCNC: 14 IU/L (ref 0–32)
AST SERPL-CCNC: 14 IU/L (ref 0–40)
BASOPHILS # BLD AUTO: 0.1 X10E3/UL (ref 0–0.2)
BASOPHILS NFR BLD AUTO: 1 %
BILIRUB SERPL-MCNC: 0.4 MG/DL (ref 0–1.2)
BUN SERPL-MCNC: 10 MG/DL (ref 6–20)
BUN/CREAT SERPL: 14 (ref 9–23)
CALCIUM SERPL-MCNC: 9.7 MG/DL (ref 8.7–10.2)
CHLORIDE SERPL-SCNC: 103 MMOL/L (ref 96–106)
CHOLEST SERPL-MCNC: 210 MG/DL (ref 100–199)
CO2 SERPL-SCNC: 22 MMOL/L (ref 20–29)
CREAT SERPL-MCNC: 0.72 MG/DL (ref 0.57–1)
EGFRCR SERPLBLD CKD-EPI 2021: 110 ML/MIN/1.73
EOSINOPHIL # BLD AUTO: 0.2 X10E3/UL (ref 0–0.4)
EOSINOPHIL NFR BLD AUTO: 3 %
ERYTHROCYTE [DISTWIDTH] IN BLOOD BY AUTOMATED COUNT: 14 % (ref 11.7–15.4)
FERRITIN SERPL-MCNC: 67 NG/ML (ref 15–150)
FOLATE SERPL-MCNC: 17.7 NG/ML
GLOBULIN SER CALC-MCNC: 2.9 G/DL (ref 1.5–4.5)
GLUCOSE SERPL-MCNC: 92 MG/DL (ref 70–99)
HBA1C MFR BLD: 5.6 % (ref 4.8–5.6)
HCT VFR BLD AUTO: 37.9 % (ref 34–46.6)
HDLC SERPL-MCNC: 38 MG/DL
HGB BLD-MCNC: 12.7 G/DL (ref 11.1–15.9)
IMM GRANULOCYTES # BLD AUTO: 0 X10E3/UL (ref 0–0.1)
IMM GRANULOCYTES NFR BLD AUTO: 0 %
IRON SATN MFR SERPL: 21 % (ref 15–55)
IRON SERPL-MCNC: 66 UG/DL (ref 27–159)
LDLC SERPL CALC-MCNC: 119 MG/DL (ref 0–99)
LYMPHOCYTES # BLD AUTO: 2 X10E3/UL (ref 0.7–3.1)
LYMPHOCYTES NFR BLD AUTO: 28 %
MCH RBC QN AUTO: 29.1 PG (ref 26.6–33)
MCHC RBC AUTO-ENTMCNC: 33.5 G/DL (ref 31.5–35.7)
MCV RBC AUTO: 87 FL (ref 79–97)
MONOCYTES # BLD AUTO: 0.3 X10E3/UL (ref 0.1–0.9)
MONOCYTES NFR BLD AUTO: 5 %
NEUTROPHILS # BLD AUTO: 4.6 X10E3/UL (ref 1.4–7)
NEUTROPHILS NFR BLD AUTO: 63 %
PLATELET # BLD AUTO: 281 X10E3/UL (ref 150–450)
POTASSIUM SERPL-SCNC: 4.3 MMOL/L (ref 3.5–5.2)
PROT SERPL-MCNC: 7.5 G/DL (ref 6–8.5)
RBC # BLD AUTO: 4.36 X10E6/UL (ref 3.77–5.28)
SODIUM SERPL-SCNC: 139 MMOL/L (ref 134–144)
TIBC SERPL-MCNC: 319 UG/DL (ref 250–450)
TRIGL SERPL-MCNC: 304 MG/DL (ref 0–149)
TSH SERPL DL<=0.005 MIU/L-ACNC: 1.33 UIU/ML (ref 0.45–4.5)
UIBC SERPL-MCNC: 253 UG/DL (ref 131–425)
VIT B12 SERPL-MCNC: 504 PG/ML (ref 232–1245)
VLDLC SERPL CALC-MCNC: 53 MG/DL (ref 5–40)
WBC # BLD AUTO: 7.1 X10E3/UL (ref 3.4–10.8)

## 2024-04-21 DIAGNOSIS — E78.2 MIXED HYPERLIPIDEMIA: ICD-10-CM

## 2024-04-21 RX ORDER — ATORVASTATIN CALCIUM 80 MG/1
80 TABLET, FILM COATED ORAL NIGHTLY
Qty: 90 TABLET | Refills: 1 | Status: SHIPPED | OUTPATIENT
Start: 2024-04-21

## 2024-04-22 ENCOUNTER — TELEPHONE (OUTPATIENT)
Dept: FAMILY MEDICINE CLINIC | Facility: CLINIC | Age: 39
End: 2024-04-22
Payer: COMMERCIAL

## 2024-04-22 ENCOUNTER — E-VISIT (OUTPATIENT)
Dept: FAMILY MEDICINE CLINIC | Facility: TELEHEALTH | Age: 39
End: 2024-04-22
Payer: COMMERCIAL

## 2024-04-22 PROCEDURE — BRIGHTMDVISIT: Performed by: NURSE PRACTITIONER

## 2024-04-22 NOTE — EXTERNAL PATIENT INSTRUCTIONS
Diagnosis   Jock itch (tinea cruris)   My name is ABHIJIT Johnson, and I'm a healthcare provider at [facility]. After reviewing your responses and photos, I see that you have tinea cruris, commonly called jock itch.   Medications   Your pharmacy   [facility] [address] [place] [id number] [phone]     Prescription   Fluconazole oral tablet (150mg): Take 1 tablet by mouth once a week for 4 weeks until the full prescribed amount is finished.   Non-prescription   Clotrimazole cream (1%): Apply thin film on affected area twice a day. For groin symptoms, use for 2 weeks. For all others, use for 4 weeks. Brands to look for include Desenex.   About your diagnosis   Jock itch is a fungal skin infection that causes a rash in the groin, inner thigh, or buttock areas. From there, the rash can spread to other parts of the body, including the feet and hands. The fungus grows in warm, moist environments, such as gyms, locker rooms, shower stalls, and swimming pools. It spreads through direct person-person contact or contact with contaminated surfaces, clothing, or towels.   Common signs and symptoms of jock itch include:    An itchy rash in the groin region    A rash that's reddish brown    A rash that spreads from the groin area in a half-moon shape    The border of the rash is often lined with small, red blisters that turn the skin flaky and scaly   Jock itch isn't serious, but it can be itchy, painful, and generally uncomfortable.   What to expect   If you follow the treatments recommended here, the rash should clear up in 2 to 4 weeks.   When to seek care   Call us at [phone]   with any sudden or unexpected symptoms.    Symptoms are still present after finishing the course of treatment.    Symptoms are severe.    Symptoms go away and then come back.   Other treatment    Gently wash the area with mild soap and water, and pat the area dry.    Trim your fingernails and try to avoid  scratching. Scratching can lead to a secondary bacterial skin infection and cause scarring.    Wear loose cotton clothing to avoid further irritating your skin.   Prevention    Keep your groin area dry. Change out of wet clothes as soon as possible. After showering or exercising, use a clean towel to dry your groin area and inner thighs. Dry your feet last to avoid spreading any fungus from your feet to your groin area. You can also dust your groin area with antifungal powder (Zeasorb, Lotrimin AF) to prevent excess moisture.    Wash your gym clothes, shower shoes, and towels thoroughly after each and every workout.    Wear clothing that keeps you cool. Avoid tight-fitting clothes.   Your provider   Your diagnosis was provided by ABHIJIT Johnson, a member of your trusted care team at [facility].   If you have any questions, call us at [phone]  .

## 2024-04-22 NOTE — E-VISIT TREATED
Chief Complaint: Rashes and other skin conditions   Patient introduction   Patient is 38-year-old female presenting with pruritic, painful rash on their groin and legs for longer than 2 weeks.   Rash is itchy, painful, red, and swollen. Rash is not bleeding. Rash is not warm to the touch.   Before the rash appeared, patient was exposed to extreme heat or cold and exercised intensely. Regarding an additional possible trigger/exposure, patient writes: I sweat a lot at work.   General presentation   Patient has not had any recent cold symptoms. No fever, chills, myalgia, nausea, vomiting, diarrhea, headache, joint pain/swelling, swelling around the eyes, or fatigue/lethargy. Rash is not present on genitals.   The following treatments were not helpful for current rash symptoms:    Non-prescription antifungal cream   Patient has history of a similar rash, with 1 previous occurrence. Previous diagnosis was tinea cruris.   The following treatments were helpful for previous rash symptoms:    Clotrimazole    An oral antifungal   Fungal rash: Patient had a similar rash 1 to 3 months ago.   Eczema: Patient has family history of atopic conditions.   Insect bites: No known recent insect exposure.   Chickenpox: Has had chickenpox.   Scabies: No recent scabies exposure.   Impetigo: No recent impetigo exposure.   Pet dander: No exposure to a new pet.   Ticks: Patient has not recently spent significant time outdoors. In previous month, patient has not spent any time in regions with a high risk of tick-borne disease.   Appearance or location of rash does not change throughout the course of a day. Pruritus described as mild and is unchanged over time. Itching doesn't affect daily activities. Itching does not affect sleep.   Rash has not spread to a new location.   No herald patch prior to onset of rash.   Rash is not in an area that is regularly shaved. Patient does not participate in contact sports. Patient does not work in a  school or childcare facility.   No history of prior MRSA infection.   Symptoms are aggravated by exercise, temperature extremes, and perspiration.   Review of red flags/alarm symptoms:    No systemic symptoms    No symptoms of anaphylactic reaction    No unexplained peeling skin    No blisters around eyes    No swollen lymph nodes    No recent history suggesting adverse drug rash (no new medication, herb, or supplement)   Pregnancy/breastfeeding: Not pregnant. Not breastfeeding. Regarding date of last menstrual period, patient writes: 4/10/24.   Current medications   Currently taking rizatriptan MLT 10 MG disintegrating tablet, famotidine 40 MG tablet, clidinium-chlordiazePOXIDE 5-2.5 MG per capsule, esomeprazole 40 MG capsule, sertraline 50 MG tablet, atorvastatin 80 MG tablet, metFORMIN 500 MG tablet, Ajovy 225 MG/1.5ML solution auto-injector, buPROPion  MG 24 hr tablet, vitamin B, Feosol, Cranberry, and ascorbic acid / inositol.   Medication allergies    Sulfa medications   Medication contraindication review   Not currently taking ACE inhibitors or ARBs.   No cerebral malaria, CHF, cutaneous hpust-pmstyl-ttzd disease, folate deficiency, G6PD deficiency (or breastfeeding a child with G6PD deficiency), generalized erythroderma, liver disease, lamellar ichthyosis, malignancy or premalignancy at the affected site, megaloblastic anemia, mononucleosis, Netherton syndrome, peripheral neuropathy, porphyria, QT prolongation, congenital long QT syndrome, skin barrier defect condition, systemic mycoses, TMP/SMX-associated thrombocytopenia, thyroid dysfunction, or ulcerative colitis.   Past medical history   Immune conditions: Regarding an autoimmune disorder they have, patient writes: Lichen schlerosus. Patient takes medications regularly for their condition(s).   No history of cancer.   Patient's last tetanus vaccination or booster was more than 10 years ago.   Patient-submitted comments   Patient was asked if they  had anything to add about their symptoms. 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 I've had this before and OTC seemed to work, but it won't go away this time..   Patient did not request an excuse note.   Assessment   Tinea cruris (Jock itch).   This is the likely diagnosis based on interview responses, including:    Symptom profile    Location of rash    History of tinea cruris   Plan   Medications:    fluconazole 150 mg tablet RX 150mg 1 tab PO once a week 28d until the full prescribed amount is finished. Amount is 4 tab.    clotrimazole 1 % topical cream OTC 1% apply thin film on affected area bid 28d For groin symptoms, use for 2 weeks. For all others, use for 4 weeks. Brands to look for include Desenex. Amount is 30 g.   The patient's prescription will be sent to:   [facility] [address] [place] [address]   Phone: [phone]     Fax: [phone]   Patient informed to purchase OTC medication.   Education:    Condition and causes    Prevention    Treatment and self-care    When to call provider   ----------   Electronically signed by ABHIJIT Johnson on 2024-04-22 at 11:28AM   ----------   Patient Interview Transcript:   Where is the affected area located? Select all that apply.    Groin    Leg   Not selected:    Scalp    Face    Inside mouth or on lips    Neck    Arm    Hand    Chest    Stomach    Back    Buttocks    Foot or in between toes    None of the above   Which leg is bothering you? Select one.    Both   Not selected:    Right    Left   Before your skin symptoms appeared, were you exposed to any of these possible triggers? Select all that apply.    Exposure to extreme hot or cold temperatures    Exercising intensely    Other (specify): I sweat a lot at work   Not selected:    Tick bite    Other insect bite or sting in the affected area    Dog or cat bite    Cut,  scrape, or other skin injury in the affected area    Contact with poison oak, poison ivy, or poison sumac in the affected area    Contact with a new soap, perfume, skincare product, cleaning product, or other chemical in the affected area    Wearing new jewelry, belt buckle, or other metal accessory in the affected area    Taking a new medication, supplement, or herb    Consuming a new food or drink    A new pet or animal, either at home or somewhere else    Vaccine injection    Sitting in a hot tub or swimming in a heated swimming pool    Wearing ill-fitting shoes or socks for a long time    Contact with someone who has a similar rash    Contact with someone who has impetigo    Contact with someone who has scabies    None of the above   You mentioned your symptoms are in your groin area. Are they on your genitals (scrotum, penis, or vulva)? Select one.    No   Not selected:    Yes   Have you ever had chickenpox? Select one.    Yes   Not selected:    No    I'm not sure   Is the affected skin in an area that you shave regularly? Select one.    No   Not selected:    Yes   Does the appearance or location of your skin symptoms change throughout the course of a day? For example, does it appear on one part of your body in the morning, disappear completely after several hours, and then reappear on a different part of your body? Select one.    No   Not selected:    Yes   Since your skin symptoms first appeared, have they spread or shown up in new locations? This includes covering a larger area than they first did, or spreading to another part of the body. Select one.    No   Not selected:    Yes   Which of these describe the affected area? Select all that apply.    Itchy    Painful    Red    Swollen   Not selected:    Warmer than other areas of my skin    None of the above   How intense is the itching? Select one.    Mild   Not selected:    Moderate    Severe    Unbearable   Has the itching gotten better, worse, or stayed  the same? Select one.    Same   Not selected:    Better    Worse   Has the itching made daily activities difficult? Select one.    No   Not selected:    Yes   Has the itching made it difficult to sleep? Select one.    No   Not selected:    Yes   Have you noticed any bleeding from the rash? Select one.    No   Not selected:    Yes   How long have you had these current skin symptoms? Select one.    More than 2 weeks   Not selected:    Less than 24 hours    24 to 48 hours    2 to 3 days    3 to 5 days    5 to 7 days    1 to 2 weeks   Do any of these make your symptoms worse? Select all that apply.    Exercise    Exposure to very hot or very cold temperature    Sweat   Not selected:    Alcohol    Certain foods    Changes in weather    , soaps, or detergents    Hot beverages    Spicy foods    Stress or strong emotions (such as anger or embarrassment)    Sun exposure    Wool in clothing or blankets    None of the above   To recommend the best treatment for you, we need to see photos of the affected area.   [image: /[facility] [email or web address]-[email or web address]]   Close-up detail (for size, shape, and color)   [image: /[facility] [employer]/02-[email or web address]-location.[email or web address]]   Surrounding area (to compare with healthy skin)   [image: /[facility] [email or web address]-rash-distance.[email or web address]]   Affected area at a distance (for scale and location)   If you choose not to send photos, you'll need to speak with a provider to get care.    Select one.    OK, I'll send photos.   Not selected:    I'd rather not send photos. Show me my care options.   Send at least 3 photos for review - Don't use a flash. - Make sure the photos are in focus. - Take a close-up photo (for size, shape, color). - Take a photo showing surrounding area (to compare with healthy skin). - Take a photo with a quarter coin or ruler near the affected area to show scale. - If more than one location is affected, repeat for each location.    Upload 1    Upload 2    Upload 3    Upload 4   Not selected:     Upload 5   A rash can be a sign of a more serious condition. These conditions may need in-person care for an exam or lab tests. Along with your skin symptoms, have you had any of these symptoms? Select all that apply.    None of these   Not selected:    Fever    Chills    Body aches or muscle aches    Joint pain or swelling, including the hands    Swelling around the eyes    Nausea    Vomiting    Diarrhea    Headache    Fatigue, exhaustion, or lethargy (feeling drowsy, dull, or low energy)   Have you had swollen lymph nodes? Swollen lymph nodes are small lumps under the skin that are soft, tender, and often painful. They may be noticed in the neck, the armpits, or the groin. Select one.    No, not that I've noticed   Not selected:    Yes   Along with your skin symptoms, have you had any of these symptoms? Select all that apply.    None of these   Not selected:    Chest pains or rapid heartbeat    Shortness of breath or wheezing    Swelling of lips or tongue    Throat tightness or hoarse voice    Stomach cramps, diarrhea, or vomiting    Confusion, dizziness, or tunnel vision    Drooling    Loss of consciousness, passing out, or fainting   Have your symptoms included any of these? Select all that apply.    No   Not selected:    Unexplained peeling skin    Blisters around the eyes   Have you recently had any cold symptoms (runny nose, nasal congestion, cough, or sore throat)? Select one.    No   Not selected:    Yes   Before the rash appeared, did you see a large, round patch on your chest, stomach, or back? This patch is usually 1 to 4 inches across, dry, and itchy. It often appears a few days to a few weeks before the rash. Select one.    No   Not selected:    Yes   Were you recently exposed to any insects? For example: - Do you have any household pets that may have fleas? - Have you noticed an increase in spiders, either indoors or outdoors? - Have you slept where bedbugs may be present? - Have you hiked, camped,  or gardened where mosquitoes may have been present?    No, not that I know of   Not selected:    Yes   In the last month, did you spend a lot of time outdoors, especially in wooded areas with brushy fields or tall grasses? Select one.    No   Not selected:    Yes   In the last month, have you been to any of these states? Scroll to see all options. Select all that apply.    No   Not selected:    [facility]    [place].    [place]    _[place] _   Did you visit any other states or countries in the last month? Select one.    No   Not selected:    Yes (specify)   Have you had these skin symptoms before? Select one.    At least once before   Not selected:    Many times before    No    I'm not sure   When you had these skin symptoms before, were they diagnosed as any of these? Select one.    Jock itch (tinea cruris)   Not selected:    Athlete's foot (tinea pedis)    Eczema (atopic dermatitis)    Intertrigo    Keratosis pilaris    Ringworm (tinea corporis)    Seborrheic dermatitis    Tinea versicolor    None of these    I'm not sure   The rash you had before was caused by a fungus. It's fairly common for this kind of rash to come back. Before this current episode, when was the last time you had this rash? Select one.    1 to 3 months ago   Not selected:    In the last 1 month    4 to 6 months ago    More than 6 months ago    I'm not sure   When you had this rash before, did you try any medications for it? Select one.    Yes   Not selected:    No   Clotrimazole cream (Lotrimin, Desenex, Pedesil)    Helpful   Not selected:    Not helpful   An oral antifungal, such as fluconazole (Diflucan), griseofulvin, itraconazole (Onmel), terbinafine (Lamisil, Terbinex)    Helpful   Not selected:    Not helpful   Do you or does anyone in your family have a history  of allergies (hay fever, seasonal allergies), eczema, or asthma? Select all that apply.    __Yes, a family member does __   Not selected:    Yes, I do    No, not that I know of   Have you ever been treated for a MRSA (methicillin-resistant Staphylococcus aureus) infection? MRSA is a type of bacteria that's resistant to many commonly used antibiotics. Select one.    No, not that I know of   Not selected:    Yes   Do you have any of these conditions? Scroll to see all options. Select all that apply.    None of the above   Not selected:    Cerebral malaria    Congenital long QT syndrome    Folate deficiency    Systemic fungal infection, such as invasive candidiasis    G6PD deficiency, or breastfeeding a child with G6PD deficiency    Infection at the affected area    Megaloblastic anemia    Mono (mononucleosis)    Decreased sensation in feet (peripheral neuropathy)    Porphyria    Skin cancer or pre-malignancy at the affected area    A serious skin condition (skin barrier defect condition, Netherton syndrome, lamellar ichthyosis, generalized erythroderma, or cutaneous wtvrg-zonwjy-ahhv disease)    QT prolongation    Thyroid dysfunction    Ulcerative colitis   Do you have any of these conditions that can affect the immune system? Scroll to see all options. Select all that apply.    An autoimmune disorder not listed here (specify): Lichen schlerosus   Not selected:    History of bone marrow transplant    Chronic kidney disease    Chronic liver disease (including cirrhosis)    HIV/AIDS    Inflammatory bowel disease (Crohn's disease or ulcerative colitis)    Lupus    Moderate to severe plaque psoriasis    Multiple sclerosis    Rheumatoid arthritis    Sickle cell anemia    Alpha or beta thalassemia    History of kidney, liver, heart, or other solid organ transplant    History of liver, heart, or other solid organ transplant    History of spleen removal    A condition requiring treatment with long-term use of oral steroids  (such as prednisone, prednisolone, or dexamethasone) (specify)    None of these   Do you take medications for your condition? This includes oral and injectable medications that are taken daily, weekly, or monthly. Select one.    Yes, regularly   Not selected:    Yes, for flare-ups only    No   Have you ever been diagnosed with cancer? Select one.    No   Not selected:    Yes, I have cancer now    Yes, but I'm in remission   Are you currently being treated for type 1 or type 2 diabetes? Select one.    No   Not selected:    Yes   Do you play sports or take part in activities with skin-to-skin contact? Select one.    No   Not selected:    Yes   Do you work in a school or childcare center? Select one.    No   Not selected:    Yes   When was your last tetanus shot (vaccination or booster)? Select one.    More than 10 years ago   Not selected:    Within the last 5 years    5 to 10 years ago    I'm not sure   Are you pregnant? Select one.    No   Not selected:    Yes   When was your last menstrual period? If you don't currently have periods or no longer have periods, please briefly explain.    4/10/24   Are you breastfeeding? Select one.    No   Not selected:    Yes   Have you tried any treatments for your current symptoms? Select one.    Yes   Not selected:    No   A non-prescription antifungal cream    Not helpful   Not selected:    Helpful   Have you taken any monoamine oxidase inhibitor (MAOI) medications in the last 14 days? Examples include rasagiline (Azilect), selegiline (Eldepryl, Zelapar), isocarboxazid (Marplan), phenelzine (Nardil), and tranylcypromine (Parnate). Select one.    No, not that I know of   Not selected:    Yes   Are you taking any of these medications? Select all that apply.    No   Not selected:    An ACE inhibitor, such as lisinopril, enalapril, captopril, or benazepril    An angiotensin II receptor blocker (ARB), such as candesartan, irbesartan, losartan, or valsartan    Kynmobi or Apokyn  "(apomorphine)   Are you still taking these medications listed in your medical record? If you're not taking any of these, click Next. Select all that apply.    rizatriptan MLT 10 MG disintegrating tablet    famotidine 40 MG tablet    clidinium-chlordiazePOXIDE 5-2.5 MG per capsule    esomeprazole 40 MG capsule    sertraline 50 MG tablet    atorvastatin 80 MG tablet    metFORMIN 500 MG tablet    Ajovy 225 MG/1.5ML solution auto-injector    buPROPion  MG 24 hr tablet   Are you taking any other medications, vitamins, or supplements? Select one.    Yes   Not selected:    No   Have you ever had an allergic or bad reaction to any medication? Select one.    Yes   Not selected:    No   Have you had an allergic or bad reaction to any of these antibiotic medications? Select all that apply.    Sulfa drugs, such as sulfamethoxazole, sulfisoxazole, sulfasalazine, or dapsone (Brands include Aczone, Bactrim, and Septra)   Not selected:    Penicillin or any \"-cillin\" antibiotic, such as amoxicillin, ampicillin, dicloxacillin, nafcillin, or piperacillin (Brands include Augmentin, Unasyn, and Zosyn)    Tetracycline or any \"-cycline\" antibiotic, such as doxycycline, demeclocycline, or minocycline (Brands include Doryx, Dynacin, Oracea, Monodox, and Vibramycin)    Cephalexin or any \"cef-\" antibiotic, such as cefazolin, cefdinir, cefuroxime, ceftriaxone, ceftazidime, or cefepime (Brands include Fortaz and Keflex)    Clindamycin or lincomycin (Brands include Cleocin and Lincocin)    Linezolid (Brands include Zyvox)    No, not that I know of   Have you had an allergic or bad reaction to any of these medications? Select all that apply.    No, not that I know of   Not selected:    Antifungals, such as clotrimazole, fluconazole, ketoconazole, miconazole, or itraconazole, (Diflucan, Extina, Lotrimin-AF, Micatin, Onmel, Zeasorb)    Antiparasitics, such as Permethrin or lindane (Nix)    Antivirals, such as acyclovir, penciclovir, or " valacyclovir (Valtrex, Zovirax)    Ciclopirox    Griseofulvin (Niurka-PEG)    Terbinafine (Lamisil)   Have you had an allergic or bad reaction to any corticosteroids? - Examples of topical corticosteroids include: hydrocortisone (Cortizone), clobetasol (Temovate, Cormax), betamethasone (Diprolene), fluocinonide (Vanos), desonide (Desowen, Desonate, Tridesilon), triamcinolone (Kenalog, Trianex), alclometasone, and mometasone (Elocon). - Examples of oral corticosteroids include: prednisone and methylprednisolone (Medrol). Select one.    No, not that I know of   Not selected:    Yes   Have you had an allergic or bad reaction to any of these topical medications? A topical medication is a cream, gel, or ointment that's put on the skin. Select all that apply.    No, not that I know of   Not selected:    Mupirocin (Bactroban)    Topical retinoids, such as adapalene, azelaic acid, tazarotene, or tretinoin (Azelex, Differin, Finacea, or Tazorac)    Pimecrolimus or tacrolimus (Elidel or Protopic)    Crisaborole (Eucrisa)   Have you had an allergic or bad reaction to any of these medications? Select all that apply.    No, not that I know of   Not selected:    Acetaminophen (Tylenol)    Ibuprofen (Advil, Motrin, Midol)    Diphenhydramine (Benadryl)    Cetirizine (Zyrtec)    Hydroxyzine pamoate (Vistaril)    Loratadine (Claritin, Alavert) or desloratadine (Clarinex)    Fexofenadine (Allegra)    Ammonium lactate lotion (Amlactin, Lac-Hydrin, Laclotion, Ultravate)    Salicylic acid cream (Salacyn, Salex)    Urea cream (Aqua Care, Nutraplus)    Calcium acetate/aluminum sulfate (Domeboro)   Have you had an allergic or bad reaction to ondansetron (Zuplenz, Zofran ODT, Zofran)? Select one.    No, not that I know of   Not selected:    Yes   Have you ever had jaundice or liver problems as a result of taking amoxicillin-clavulanate (Augmentin)? Jaundice is a condition in which the skin and the whites of the eyes turn yellow. Select all  that apply.    No, not that I know of   Not selected:    Yes, jaundice    Yes, liver problems   Do you need a doctor's note? A doctor's note confirms that you received care today and states when you can return to school or work. It does not contain information about your diagnosis or treatment plan. Your provider will make the final decision on whether to give you a doctor's note. Doctor's notes cannot be backdated. Select one.    No   Not selected:    Today only (1 day)    Today and tomorrow (2 days)    3 days   Is there anything you'd like to add about your symptoms? Please limit your comments to the symptoms covered in this interview. If you include comments about other concerns, your provider may recommend that you be seen in person.    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 I've had this before and OTC seemed to work, but it won't go away this time.   ----------   Medical history   The following information was received from the EMR on April 22, 2024.   Allergies:    SULFAMETHOXAZOLE   - Allergy Type: Medication   - Reaction: Other (See Comments)   - Severity: High   - Clinical Status: Active   - Verification Status: Confirmed    TRIMETHOPRIM   - Allergy Type: Medication   - Reaction: Other (See Comments)   - Severity: High   - Clinical Status: Active   - Verification Status: Confirmed    TOPIRAMATE   - Allergy Type: Medication   - Reaction: Other (See Comments)   - Severity: None   - Clinical Status: Active   - Verification Status: Confirmed    SULFAMETHOXAZOLE-TRIMETHOPRIM   - Allergy Type: Medication   - Reaction: Anaphylaxis, Anxiety, Dermatitis, Hives, Itching, Nausea Only, Palpitations, Rash, Swelling   - Severity: High   - Clinical Status: Active   - Verification Status: Confirmed   Medications:    rizatriptan (MAXALT MLT) disintegrating tablet   - Route:   - Start Date: April 18, 2022   - End Date: None    - Status: Active    famotidine (PEPCID) tablet   - Route:   - Start Date: March 24, 2023   - End Date: None   - Status: Active    clidinium-chlordiazePOXIDE (LIBRAX) 2.5-5 mg per capsule   - Route: Oral   - Start Date: March 29, 2024   - End Date: None   - Status: Active    esomeprazole (nexIUM) capsule   - Route: Oral   - Start Date: April 19, 2024   - End Date: None   - Status: Active    sertraline (ZOLOFT) tablet   - Route: Oral   - Start Date: April 19, 2024   - End Date: None   - Status: Active    atorvastatin (LIPITOR) tablet   - Route: Oral   - Start Date: April 21, 2024   - End Date: None   - Status: Active    metFORMIN (GLUCOPHAGE) tablet   - Route: Oral   - Start Date: April 03, 2024   - End Date: None   - Status: Active    AJOVY 225 MG/1.5ML SC SOAJ   - Route:   - Start Date: September 19, 2023   - End Date: None   - Status: Active    buPROPion (WELLBUTRIN XL) 24 hr tablet   - Route: Oral   - Start Date: April 19, 2024   - End Date: None   - Status: Active   Problem list:    Mixed hyperlipidemia   - Category: Problem List Item   - Health Status:   - Start Date: April 18, 2022   - End Date: None   - Status: Active    Gastroesophageal reflux disease without esophagitis   - Category: Problem List Item   - Health Status:   - Start Date: April 18, 2022   - End Date: None   - Status: Active    Depression   - Category: Problem List Item   - Health Status:   - Start Date: April 18, 2022   - End Date: None   - Status: Active    Atypical chest pain   - Category: Problem List Item   - Health Status:   - Start Date: July 12, 2022   - End Date: None   - Status: Active    Esophageal dysphagia   - Category: Problem List Item   - Health Status:   - Start Date: July 12, 2022   - End Date: None   - Status: Active    Sensation of lump in throat   - Category: Problem List Item   - Health Status:   - Start Date: July 12, 2022   - End Date: None   - Status: Active

## 2024-06-30 ENCOUNTER — E-VISIT (OUTPATIENT)
Dept: FAMILY MEDICINE CLINIC | Facility: TELEHEALTH | Age: 39
End: 2024-06-30

## 2024-06-30 PROCEDURE — BRIGHTMDVISIT: Performed by: NURSE PRACTITIONER

## 2024-06-30 NOTE — E-VISIT TREATED
Chief Complaint: Bladder infection (UTI)   Patient introduction   Patient is 38-year-old female. Patient provided the following organ inventory: Presence of vagina.   Patient has had dysuria and frequent urination for less than 24 hours.   Urine is cloudy with a strong or pungent odor.   General presentation   No fever. No nausea or vomiting.   Mild abdominal or pelvic pain.   No back pain.   No flank pain.   Has not tried acetaminophen, ibuprofen, or phenazopyridine for current symptoms.   Has not taken antibiotics for current symptoms.   Previous history of UTI. Current symptoms feel exactly the same as previous UTIs. Received treatment for UTI 1 to 3 times in last year. Patient's last use of antibiotics for UTI was more than 3 months ago.   Previously developed yeast infections as a result of taking antibiotics for past UTIs.   No history of pyelonephritis. No history of kidney stones.   Had sexual intercourse in the past week. Does not use diaphragm. No unprotected sexual intercourse with a new partner in the last 2 weeks.   Patient is not being treated for diabetes mellitus.   Review of red flags/alarm symptoms:    No recent hospitalizations or nursing home care (last 3 months)    No history of renal failure    No recent history of urologic instrumentation    No anatomic abnormalities of the urinary tract    No abnormal vaginal discharge    No visible vaginal sores    No pain with sexual intercourse    No abnormal vaginal bleeding or spotting   Pregnancy/menstrual status/breastfeeding:   Patient is not pregnant. Patient is not breastfeeding. Regarding date of last menstrual period, patient writes: My last period started on June 7th.   Current medications   Currently taking rizatriptan MLT 10 MG disintegrating tablet, famotidine 40 MG tablet, clidinium-chlordiazePOXIDE 5-2.5 MG per capsule, esomeprazole 40 MG capsule, sertraline 50 MG tablet, atorvastatin 80 MG tablet, metFORMIN 500 MG tablet, Ajovy 225  MG/1.5ML solution auto-injector, buPROPion  MG 24 hr tablet, liraglutide Pen Injector [Saxenda], AMINO ACIDS/MULTIVITAMINS, Feosol, Cranberry, ALOE/VITAMIN A/VITAMIN D/VITAMIN E, Allegra 24 Hour Allergy, and Flonase.   Medication allergies    Nitrofurantoin    Sulfa drugs   Medication contraindication review   Not taking ACE inhibitors and ARBs.   No history of Lennox-Danlos syndrome, folate deficiency, G6PD deficiency, high blood pressure, aortic aneurysm or dissection, Marfan syndrome, megaloblastic anemia, mononucleosis, myasthenia gravis, oliguria/anuria, or peripheral vascular disease.   No known history of amoxicillin-clavulanate-associated cholestatic jaundice or nitrofurantoin-associated cholestatic jaundice.   Past medical history   Immune conditions: Regarding an autoimmune disorder they have, patient writes: Lichen Sclerosus. Patient takes medications regularly for their condition(s).   No history of cancer.   Patient-submitted comments   Patient was asked if they had anything to add about their symptoms. Patient writes: I almost always get a yeast infection with any antibiotic, and I usually have to take 2 doses of Diflucan as the OTC medications do not work for me. I'm very allergic to Bacterium, but Macrobid makes me violently ill..   Patient did not request an excuse note.   Assessment   Uncomplicated acute UTI.   This is the likely diagnosis based on patient's interview responses, including:    Symptom profile    Previous history of UTI    Current symptoms are exactly the same as previous UTIs    Recent history of delaying urination   No recent history of kidney stones.   Plan   Medications:    cefdinir 300 mg capsule RX 300mg 1 cap PO q12h 7d for infection. This medication is an antibiotic. Take it exactly as directed. You must finish the entire course of medication, even if you feel better after taking the first few doses. Amount is 14 cap.    phenazopyridine 200 mg tablet RX 200mg 1 tab PO  tid PRN 2d for pain or discomfort associated with your condition. Amount is 6 tab.    fluconazole 150 mg tablet RX 150mg 1 tab PO once 1d as a single dose for vaginal yeast infection. Repeat in 72 hours if symptoms persist. Amount is 2 tab.   The patient's prescriptions will be sent to:   [facility] [id number] [facility] [address]   Phone: [phone]     Fax: [phone]   Education:    Condition and causes    Prevention    Treatment and self-care    When to call provider   Follow-up:   Patient to follow up as needed for progression or lack of improvement in symptoms within 3d.   ----------   Electronically signed by ABHIJIT Hinds on 2024-06-30 at 11:15AM   ----------   Patient Interview Transcript:   Knowing about your anatomy is important for diagnosing and treating UTIs. The gender we have on file for you is female, but we realize this might not tell the whole story. Which of these do you have? Select one.    Vagina   Not selected:    Penis   Which of these symptoms do you have? Select all that apply.    Pain or burning while urinating    Frequent urination   Not selected:    _Sudden urge to urinate and it's hard to hold the urine in _   How long have you had these symptoms? Select one.    Less than 24 hours   Not selected:    1 to 3 days    4 to 6 days    7 to 10 days    More than 10 days   Have you already started taking antibiotics for your current symptoms? Select one.    No   Not selected:    Yes   Since your current symptoms started, has it been difficult to start, stop, or delay urination? Select one.    No   Not selected:    Yes   What color is your urine? Select one.    Cloudy   Not selected:    Clear    Yellow    Pink or red   Does your urine smell strange (like ammonia) or stronger than usual? Select one.    Yes   Not selected:    No   Do you also have any of these symptoms? Select all that apply.    Pain, pressure, or discomfort in the lower abdomen   Not  selected:    Fever    Nausea    Vomiting    Back pain    No   How would you describe your lower abdominal pain, pressure, or discomfort? Select one.    Mild; I only notice it when I pay attention to it   Not selected:    Moderate; it's uncomfortable and gets in the way of doing daily tasks    Severe; I can't get comfortable, and it stops me from doing daily tasks   Do you have any flank pain? The flank is the side of the body between the ribs and the hips.    No   Not selected:    Yes, in my left flank    Yes, in my right flank    Yes, in both my left and right flanks   Do you have any of these vaginal symptoms? Select all that apply.    No   Not selected:    Abnormal vaginal itching    Unscheduled or abnormal vaginal bleeding or spotting    Pain during sex    Visible sores on the vagina    Abnormal vaginal discharge   In the past 2 weeks, have you had a medical device or instrument placed in your urinary tract? Examples include catheters, stents, and nephrostomy tubes. Select one.    No   Not selected:    Yes   Have you recently been hospitalized or been a resident of a nursing home or other long-term care facility? This doesn't include emergency room (ER) visits. Select one.    No   Not selected:    Yes, within the last 2 weeks    Yes, within the last 3 months   Kidney failure    No   Not selected:    Within the last year    More than a year ago   Kidney infection (pyelonephritis)    No   Not selected:    Within the last year    More than a year ago   Kidney stones    No   Not selected:    Within the last year    More than a year ago   Kidney transplant    No   Not selected:    Within the last year    More than a year ago   Have you ever been diagnosed with any of these? Select all that apply.    No   Not selected:    Urinary reflux    Bladder diverticula    Single (or horseshoe) kidney    Duplicated urethra   Have you recently held your urine for a long time after you felt the urge to go? Select one.    Yes   Not  selected:    No   Have you recently avoided eating or drinking so you wouldn't have the urge to urinate as often? Select one.    No   Not selected:    Yes   Do you use a diaphragm? Select one.    No   Not selected:    Yes   Are you pregnant? Select one.    No   Not selected:    Yes   When was your last menstrual period? If you don't currently have periods or no longer have periods, please briefly explain.    My last period started on June 7th   Are you breastfeeding? Select one.    No   Not selected:    Yes   Have you had sexual intercourse in the past week? Recent sexual intercourse is a risk factor for urinary tract infections. Select one.    Yes   Not selected:    No   Have you had unprotected sexual intercourse with a new partner in the last 2 weeks? Select one.    No   Not selected:    Yes   Have you traveled to any of these countries within the last 3 months? Recent travel to these countries may affect which medication we recommend for your symptoms. Select all that apply.    None of these   Not selected:    [place]    [place]    [place]    [place]   Have you ever had a urinary tract infection (UTI)? A UTI is often called a bladder infection or acute cystitis. Select one.    Yes   Not selected:    No, not that I know of   How much do your current symptoms feel like past UTIs? Select one.    Exactly the same   Not selected:    Mostly the same    Somewhat the same    Totally different   In the past year, how many times have you taken antibiotics for a UTI? Select one.    1 to 3   Not selected:    0    4 or more   When did you last take antibiotics for a UTI? Select one.    More than 3 months ago   Not selected:    Within the last 3 months   Have you ever developed a yeast infection as a result of taking antibiotics? Select one.    Yes   Not selected:    No, not that I know of   UTIs may be more serious when other factors are present. Let's address those now. Are you being treated for type 1 or type 2 diabetes? Select  one.    No   Not selected:    Yes   Do you have any of these conditions that can affect the immune system? Scroll to see all options. Select all that apply.    An autoimmune disorder not listed here (specify): Lichen Sclerosus   Not selected:    History of bone marrow transplant    Chronic kidney disease    Chronic liver disease (including cirrhosis)    HIV/AIDS    Inflammatory bowel disease (Crohn's disease or ulcerative colitis)    Lupus    Moderate to severe plaque psoriasis    Multiple sclerosis    Rheumatoid arthritis    Sickle cell anemia    Alpha or beta thalassemia    History of kidney, liver, heart, or other solid organ transplant    History of liver, heart, or other solid organ transplant    History of spleen removal    A condition requiring treatment with long-term use of oral steroids (such as prednisone, prednisolone, or dexamethasone) (specify)    None of these   Do you take medications for your condition? This includes oral and injectable medications that are taken daily, weekly, or monthly. Select one.    Yes, regularly   Not selected:    Yes, for flare-ups only    No   Have you ever been diagnosed with cancer? Select one.    No   Not selected:    Yes, I have cancer now    Yes, but I'm in remission   These last few questions will help us create the right treatment plan for you. Are you being treated for any of these conditions? Select all that apply.    No   Not selected:    Lennox-Danlos syndrome    Folate deficiency    G6PD deficiency    High blood pressure    History of aortic aneurysm or dissection    Marfan syndrome    Megaloblastic anemia    Mono (mononucleosis)    Myasthenia gravis    Oliguria or anuria    Peripheral vascular disease   Have you ever had jaundice as a result of taking amoxicillin-clavulanate (Augmentin) or nitrofurantoin (Macrobid)? Select all that apply.    No   Not selected:    Yes, from amoxicillin-clavulanate (Augmentin)    Yes, from nitrofurantoin (Macrobid, Macrodantin)    "Are you taking any of these medications? Select all that apply.    No   Not selected:    An ACE inhibitor such as lisinopril, enalapril, captopril, or benazepril    An angiotensin II receptor blocker (ARB) such as candesartan, irbesartan, losartan, or valsartan   Are you still taking these medications listed in your medical record? If you're not taking any of these, click Next. Select all that apply.    rizatriptan MLT 10 MG disintegrating tablet    famotidine 40 MG tablet    clidinium-chlordiazePOXIDE 5-2.5 MG per capsule    esomeprazole 40 MG capsule    sertraline 50 MG tablet    atorvastatin 80 MG tablet    metFORMIN 500 MG tablet    Ajovy 225 MG/1.5ML solution auto-injector    buPROPion  MG 24 hr tablet   Are you taking any other medications, vitamins, or supplements? Select one.    Yes   Not selected:    No   Have you ever had an allergic or bad reaction to any medication? Select one.    Yes   Not selected:    No   Have you had an allergic or bad reaction to any of these medications? Select all that apply.    Nitrofurantoin (brands include Furadantin, Macrobid and Macrodantin)    Sulfamethoxazole-trimethoprim (brands include Bactrim and Septra) or any other sulfa drug   Not selected:    Any antibiotic starting with \"cef-,\" such as cefazolin, cefdinir, cefuroxime, ceftriaxone, ceftazidime, cefepime, or cephalexin (brands include Fortaz and Keflex)    Any antibiotic ending with \"-floxacin,\" such as ciprofloxacin, gemifloxacin, levofloxacin, moxifloxacin, or ofloxacin (brands include Cipro, Factive, Floxin, and Levaquin)    Any antibiotic ending with \"-cillin,\" such as penicillin, amoxicillin, ampicillin, dicloxacillin, nafcillin, or piperacillin (brands include Augmentin, Unasyn, and Zosyn)    Fluconazole (brand name Diflucan), itraconazole, or terconazole    Trimethoprim (brand name Primsol)    No, not that I know of   Have you had an allergic or bad reaction to any of these medications? Select all that " apply.    No   Not selected:    Acetaminophen (Tylenol)    Ibuprofen (Advil, Midol, Motrin)    Phenazopyridine (Azo, Baridium, Pyridium, Uricalm, Uristat)   Do you need a doctor's note? A doctor's note confirms that you received care today and states when you can return to school or work. It does not contain information about your diagnosis or treatment plan. Your provider will make the final decision on whether to give you a doctor's note. Doctor's notes CANNOT be backdated. Select one.    No   Not selected:    Today only (1 day)    Today and tomorrow (2 days)    3 days   Is there anything you'd like to add about your symptoms? Please limit your comments to the symptoms covered in this interview. If you include comments about other concerns, your provider may recommend that you be seen in person.    I almost always get a yeast infection with any antibiotic, and I usually have to take 2 doses of Diflucan as the OTC medications do not work for me. I'm very allergic to Bacterium, but Macrobid makes me violently ill.   ----------   Medical history   Medical history data does not currently exist for this patient.

## 2024-06-30 NOTE — EXTERNAL PATIENT INSTRUCTIONS
Note   Drink plenty of water and avoid caffeine If symptoms do not improve in 3-5 days follow up with your primary care provider or urgent care   Diagnosis   Urinary tract infection (UTI)   My name is ABHIJIT Hinds. I'm a healthcare provider at [facility]. After reviewing your interview, I see you have a urinary tract infection (UTI).   Medications   Your pharmacy   [facility] [address] [place] [id number] [phone]     Prescription   Cefdinir (300mg): Take 1 capsule by mouth every 12 hours for 7 days for infection. This medication is an antibiotic. Take it exactly as directed. You must finish the entire course of medication, even if you feel better after taking the first few doses.   Phenazopyridine (200mg): Take 1 tablet by mouth 3 times a day as needed for 2 days for pain or discomfort associated with your condition.   Fluconazole (150mg): Take 1 tablet by mouth once for 1 day as a single dose. Use this medication only if you develop a yeast infection. If yeast infection symptoms are still present 3 days after taking the first tablet, take the second tablet.    I've given you a prescription dose of phenazopyridine. If it's more affordable or convenient, you may use the equivalent amount of non-prescription phenazopyridine. For example, instead of taking one 200 mg phenazopyridine tablet, you may take two 95 mg phenazopyridine tablets.    Because you've developed yeast infections after taking antibiotics in the past, I've prescribed Diflucan (fluconazole). Diflucan is an oral antifungal that treats yeast infections. Use this medication only if you develop a yeast infection.   About your diagnosis   A UTI is an infection of one or more parts of the urinary tract, most commonly the bladder.   Most UTIs are caused by bacteria (usually E. coli) that travel up the urethra and into the bladder. I see that you have some common signs and symptoms of a UTI:    Pain or burning  while urinating    Frequent urination    Symptoms that feel a lot like past UTIs    Mild or moderate pain, pressure, or discomfort in your lower abdomen    Cloudy urine    Strange or strong smelling urine    Symptoms that began shortly after sexual intercourse   Fortunately, most UTIs aren't serious, and they're easily treated with antibiotics. Make sure you take all of the antibiotic pills given to you, even if you start to feel better after the first few doses. Otherwise, the UTI might come back.   What to expect   If you follow this treatment plan, you should start to feel better within 1 to 2 days.   When to seek care   Call us at [phone]   with any sudden or unexpected symptoms.    Symptoms that don't improve or get worse in the next 48 hours    Fever that goes above 101F or lasts longer than 24 hours    Shaking or chills    Nausea or vomiting    Severe flank pain (pain in your back or side)   Other treatment    Rest and drink plenty of water    Urinate frequently and when you first feel the urge    Place a heating pad on your back or stomach to help relieve some of the discomfort   Prevention    Drink a lot of liquids to help flush bacteria from your system. Water is best. Try for six to eight, 8-ounce glasses a day on a regular basis.    Urinate often and when you first feel the urge. Bacteria can grow when urine stays in the bladder too long. Urinate after sex to flush away bacteria.    After using the toilet, always wipe from front to back. This step is most important after a bowel movement. Wiping from front to back prevents bacteria normally found in stool from entering the urinary tract.   Your provider   Your diagnosis was provided by ABHIJIT Hinds, a member of your trusted care team at [facility].   If you have any questions, call us at [phone]  .

## 2024-09-16 RX ORDER — BUPROPION HYDROCHLORIDE 150 MG/1
150 TABLET ORAL DAILY
Qty: 90 TABLET | Refills: 2 | Status: SHIPPED | OUTPATIENT
Start: 2024-09-16

## 2024-09-23 ENCOUNTER — E-VISIT (OUTPATIENT)
Dept: FAMILY MEDICINE CLINIC | Facility: TELEHEALTH | Age: 39
End: 2024-09-23
Payer: COMMERCIAL

## 2024-09-23 DIAGNOSIS — K13.0 ANGULAR CHEILITIS: Primary | ICD-10-CM

## 2024-09-23 PROCEDURE — FABRICHEALTHVISIT: Performed by: NURSE PRACTITIONER

## 2024-09-23 RX ORDER — MICONAZOLE NITRATE 20 MG/G
1 CREAM TOPICAL 2 TIMES DAILY
Qty: 35 G | Refills: 0 | Status: SHIPPED | OUTPATIENT
Start: 2024-09-23

## 2024-11-02 DIAGNOSIS — K21.9 GASTROESOPHAGEAL REFLUX DISEASE WITHOUT ESOPHAGITIS: ICD-10-CM

## 2024-11-04 RX ORDER — CHLORDIAZEPOXIDE HYDROCHLORIDE AND CLIDINIUM BROMIDE 5; 2.5 MG/1; MG/1
CAPSULE ORAL
Qty: 270 CAPSULE | Refills: 1 | Status: SHIPPED | OUTPATIENT
Start: 2024-11-04

## 2025-03-29 ENCOUNTER — E-VISIT (OUTPATIENT)
Dept: FAMILY MEDICINE CLINIC | Facility: TELEHEALTH | Age: 40
End: 2025-03-29
Payer: COMMERCIAL

## 2025-03-29 NOTE — E-VISIT TREATED
Date: 2025 10:20:53  Clinician: Georgina Gutierrez  Clinician NPI: [id number]  Patient: Amanda Le  Patient : 1985  Patient Address: [address] [place] [address]  Patient Phone: [phone]  Visit Protocol: UTI  Patient Summary:  Amanda is a 39 year old ( : 1985 ) female who initiated a visit for a presumed bladder infection.    The symptoms started 1-3 days ago and consist of dysuria, foul-smelling urine, abdominal pain, urgency, urinary frequency,   and feeling as if the bladder is never empty.   Symptom details     Urine color: Brown     Abdominal pain: The pain is moderate (4-6 on a 10 point pain scale).      Denied symptoms include nausea, chills, vaginal itching, vaginal discharge, urinary incontinence, and vomiting. Amanda denies flank pain. Amanda does not feel feverish.   Amanda has not used any over-the-counter medications or home remedies to   relieve the current symptoms.  Precipitating events  Amanda denies having a sexually transmitted infection.  Pertinent medical history  Amanda has had a bladder infection before and has had 2 in the past 12 months. The most recent bladder infection   was not within the last 4 weeks. The current symptoms are similar to previous bladder infection symptoms.   Nitrofurantoin (Macrobid), ciprofloxacin (Cipro), sulfamethoxazole-trimethoprim (Bactrim DS), and cephalexin (Keflex) has been effective in   treating past bladder infections.   Amanda has experienced problems or side effects with the following antibiotics in the past: sulfamethoxazole-trimethoprim (Bactrim DS) and nitrofurantoin (Macrobid)   Amanda typically gets a yeast infection when   taking antibiotics. Amanda has successfully used Diflucan to treat previous yeast infections. 2 doses of fluconazole (Diflucan) has typically been needed for symptoms to resolve in the past.  Amanda has not been prescribed antibiotics to prevent   frequent or repeated  bladder infections in the past.   Amanda has not had a procedure or surgery done to the urinary tract. Amanda has not been diagnosed with advanced kidney disease.   Amanda has not used a catheter and denies being a patient in a   hospital or nursing home in the past 2 weeks. Amanda does not have diabetes. Immunosuppressive conditions (e.g., chemotherapy, HIV, organ transplant, long-term use of steroids or other immunosuppressive medications, splenectomy) were denied.     Amanda does not smoke or use smokeless tobacco. Amanda does not vape or use other e-cigarette products.   Amanda denies pregnancy and denies breastfeeding.     MEDICATIONS: tretinoin topical, azelaic acid topical, clobetasol-emollient topical, Nexium oral, Wegovy subcutaneous, chlordiazepoxide-clidinium oral, rizatriptan oral, Ajovy Autoinjector subcutaneous, metformin oral, famotidine oral, bupropion HCl   oral, ALLERGIES: trimethoprim, sulfamethoxazole-trimethoprim, topiramate, sulfamethoxazole, Macrolide Antibiotics  Clinician Response:  Dear Amanda,  Based on the information you have provided, you have an acute urinary tract infection, also called a bladder infection. Bladder infections occur when bacteria from the outside of the body enters the urinary tract. Any   part of the urinary system can be infected, but the bladder is the most common.  Medication information  I am prescribing:     Cephalexin (Keflex) 500 mg oral capsule. Take 1 capsule by mouth every 12 hours for 7 days. There are no refills with this prescription.   The medication I prescribed for your bladder infection is an antibiotic. Continue taking the medication until it is   gone even if you feel better. If you get an upset stomach while taking antibiotics, taking the medication with food can help.   Because you usually get a yeast infection when taking antibiotics, I am also prescribing:     Fluconazole (Diflucan) 150 mg oral tablet. Take 1 tablet by mouth in a  single dose. Repeat dose in 3 days if symptoms are still present. There are no refills with this prescription.   Self care  Urination helps to flush bacteria from the urinary tract.   For this reason, drinking water and urinating often helps relieve some urinary symptoms and can decrease your risk of getting bladder infections in the future.  Other steps you can take to prevent future bladder infections include:     Wipe front to back after using the bathroom    Urinate after sexual intercourse    Avoid using deodorant sprays, douches, or powders in the vaginal area     When to seek care  Please make an appointment to be seen in a clinic or urgent care if any of the following occur:     You develop new symptoms or your symptoms become worse    You have medication side effects that make it difficult to take them as prescribed    Your symptoms do not improve within 1-2 days of starting treatment    You have symptoms of a bladder infection that return shortly after completing treatment     It is possible to have an allergic reaction to an antibiotic even if you have not had one in the past. If you notice a new rash, significant swelling, or difficulty breathing, stop taking this medication immediately and go to a clinic or urgent care.   Diagnosis: Acute uncomplicated bladder infection  Diagnosis ICD: N39.0    Follow up instructions:  ATTENTION: If you have been prescribed medications, your prescriptions will not be sent until you choose your pharmacy. To do so open the link within your notification, or go to [employer] and click eVisit in the menu to open your   treatment plan. From there, you can select your pharmacy at the bottom of your after visit summary. You can also go to [email or web address]   Prescriptions  Prescription: fluconazole (Diflucan) 150 mg oral tablet, take 1 tablet by mouth in a single dose, repeat dose in 3 days if symptoms are still present  Sent To: [facility]  505 - [phone] [address] [place] [address]  Prescription: cephalexin (Keflex) 500 mg oral capsule, take 1 capsule by mouth every 12 hours for 7 days  Sent To: [facility] - [phone] - [address] [place] [address]

## 2025-03-31 DIAGNOSIS — E78.2 MIXED HYPERLIPIDEMIA: ICD-10-CM

## 2025-03-31 DIAGNOSIS — K21.9 GASTROESOPHAGEAL REFLUX DISEASE WITHOUT ESOPHAGITIS: ICD-10-CM

## 2025-03-31 RX ORDER — ATORVASTATIN CALCIUM 20 MG/1
20 TABLET, FILM COATED ORAL DAILY
Qty: 90 TABLET | Refills: 0 | OUTPATIENT
Start: 2025-03-31

## 2025-03-31 RX ORDER — ESOMEPRAZOLE MAGNESIUM 40 MG/1
40 CAPSULE, DELAYED RELEASE ORAL
Qty: 90 CAPSULE | Refills: 0 | Status: SHIPPED | OUTPATIENT
Start: 2025-03-31

## 2025-04-30 ENCOUNTER — OFFICE VISIT (OUTPATIENT)
Dept: FAMILY MEDICINE CLINIC | Facility: CLINIC | Age: 40
End: 2025-04-30
Payer: COMMERCIAL

## 2025-04-30 VITALS
SYSTOLIC BLOOD PRESSURE: 118 MMHG | HEIGHT: 65 IN | OXYGEN SATURATION: 100 % | WEIGHT: 198 LBS | BODY MASS INDEX: 32.99 KG/M2 | DIASTOLIC BLOOD PRESSURE: 82 MMHG | HEART RATE: 83 BPM

## 2025-04-30 DIAGNOSIS — E55.9 VITAMIN D DEFICIENCY: ICD-10-CM

## 2025-04-30 DIAGNOSIS — K21.9 GASTROESOPHAGEAL REFLUX DISEASE WITHOUT ESOPHAGITIS: ICD-10-CM

## 2025-04-30 DIAGNOSIS — D50.9 IRON DEFICIENCY ANEMIA, UNSPECIFIED IRON DEFICIENCY ANEMIA TYPE: ICD-10-CM

## 2025-04-30 DIAGNOSIS — E78.2 MIXED HYPERLIPIDEMIA: Primary | ICD-10-CM

## 2025-04-30 DIAGNOSIS — R53.83 OTHER FATIGUE: ICD-10-CM

## 2025-04-30 DIAGNOSIS — F32.A DEPRESSION, UNSPECIFIED DEPRESSION TYPE: ICD-10-CM

## 2025-04-30 DIAGNOSIS — D53.1 MEGALOBLASTIC ANEMIA: ICD-10-CM

## 2025-04-30 DIAGNOSIS — R73.09 HIGH GLUCOSE: ICD-10-CM

## 2025-04-30 DIAGNOSIS — M79.2 THORACIC NEURALGIA: ICD-10-CM

## 2025-04-30 DIAGNOSIS — G43.909 MIGRAINE WITHOUT STATUS MIGRAINOSUS, NOT INTRACTABLE, UNSPECIFIED MIGRAINE TYPE: ICD-10-CM

## 2025-04-30 DIAGNOSIS — Z00.00 PHYSICAL EXAM: ICD-10-CM

## 2025-04-30 RX ORDER — FREMANEZUMAB-VFRM 225 MG/1.5ML
225 INJECTION SUBCUTANEOUS
Qty: 6 ML | Refills: 3 | Status: SHIPPED | OUTPATIENT
Start: 2025-04-30 | End: 2025-05-05 | Stop reason: SDUPTHER

## 2025-04-30 RX ORDER — FREMANEZUMAB-VFRM 225 MG/1.5ML
225 INJECTION SUBCUTANEOUS
Qty: 6 ML | Refills: 3 | Status: SHIPPED | OUTPATIENT
Start: 2025-04-30 | End: 2025-04-30 | Stop reason: SDUPTHER

## 2025-04-30 RX ORDER — BUPROPION HYDROCHLORIDE 150 MG/1
150 TABLET ORAL DAILY
Qty: 90 TABLET | Refills: 3 | Status: SHIPPED | OUTPATIENT
Start: 2025-04-30 | End: 2025-05-05 | Stop reason: SDUPTHER

## 2025-04-30 RX ORDER — ESOMEPRAZOLE MAGNESIUM 40 MG/1
40 CAPSULE, DELAYED RELEASE ORAL
Qty: 90 CAPSULE | Refills: 3 | Status: SHIPPED | OUTPATIENT
Start: 2025-04-30 | End: 2025-05-05 | Stop reason: SDUPTHER

## 2025-04-30 RX ORDER — CHLORDIAZEPOXIDE HYDROCHLORIDE AND CLIDINIUM BROMIDE 5; 2.5 MG/1; MG/1
1 CAPSULE ORAL
Qty: 270 CAPSULE | Refills: 1 | Status: SHIPPED | OUTPATIENT
Start: 2025-04-30 | End: 2025-05-05 | Stop reason: SDUPTHER

## 2025-04-30 RX ORDER — RIZATRIPTAN BENZOATE 10 MG/1
10 TABLET, ORALLY DISINTEGRATING ORAL ONCE AS NEEDED
Qty: 27 TABLET | Refills: 3 | Status: SHIPPED | OUTPATIENT
Start: 2025-04-30 | End: 2025-05-05 | Stop reason: SDUPTHER

## 2025-04-30 NOTE — PROGRESS NOTES
Follow Up Office Visit      Date of Visit:  2025   Patient Name: Amanda Le  : 1985   MRN: [id number]     Chief Complaint:    Chief Complaint   Patient presents with    Annual Exam     physical       History of Present Illness: Amanda Le is a 39 y.o. female who is here today for follow up.    History of Present Illness  The patient presents for a physical exam.    Severe pain radiating from the back to the big toe is reported, occasionally causing discomfort even upon light touch and sometimes disrupting sleep. Additionally, a stabbing sensation in the upper middle back is experienced, which intensifies when extending the arms, such as during dishwashing or laundry folding. This could be due to a bulging thoracic disc. Poor posture is acknowledged, and PowerStep inserts are used for support. Relief is found with naproxen.    Ajovy has been used for several years and is effective for migraines. Maxalt MLT is also taken but causes significant head pressure for about an hour post-administration. Imitrex has been tried without success. Refills for Ajovy and Maxalt MLT are requested.    Metformin is taken for PCOS, and a refill is needed. Librax is taken three times daily with meals.    Zoloft 50 mg has not been taken, but Wellbutrin  mg is currently used and found effective. A challenging winter due to seasonal affective disorder (SAD) is reported.    Lipitor 80 mg was discontinued in 2024 due to joint pain, which resolved three days after stopping the medication. A genetic predisposition to high cholesterol is believed, as the patient's mother had six bypasses at age 46 following a massive heart attack. The patient does not smoke or use marijuana.    Esophageal dilation was performed in 2024, and Nexium 40 mg is currently taken with beneficial effects. Constipation, diarrhea, or swelling are not experienced. Knees and hips function well.    PAST SURGICAL  HISTORY:  Esophageal dilation in 08/2024.    SOCIAL HISTORY  She does not smoke or use marijuana.    FAMILY HISTORY  Her mother has high cholesterol and had six bypasses at the age of 46 following a massive heart attack. Her mother also suffers from seasonal affective disorder.      Subjective      Review of Systems:   Review of Systems    Past Medical History:   Past Medical History:   Diagnosis Date    Anemia 2023    Anxiety 1999    I’ve always had it    Arthritis 2022    Cholelithiasis 2017    I had my gallbladder removed    Depression 2007    GERD (gastroesophageal reflux disease) 2017    Headache 1999    I’ve always had migraines    High cholesterol 01/26/2016    Lichen sclerosus     Low back pain 2022    Obesity 1999    I’ve always been overweight    oral abscess     drained    PCOS (polycystic ovarian syndrome)     Urinary tract infection 2004    I get 1 or 2 a year    Visual impairment Birth    I’ve always has visual snow       Past Surgical History:   Past Surgical History:   Procedure Laterality Date    CHOLECYSTECTOMY  2017    ESOPHAGEAL DILATATION  10/2019    spsms       Family History:   Family History   Problem Relation Age of Onset    Diabetes Mother     Coronary artery disease Mother     Anxiety disorder Mother     Arthritis Mother     Depression Mother     Heart disease Mother     Miscarriages / Stillbirths Mother         2x miscarriages    Diabetes Sister     Anxiety disorder Sister     Depression Sister     Hypertension Brother     Obesity Brother     Diabetes Maternal Grandfather     Alzheimer's disease Maternal Grandfather     Alcohol abuse Maternal Grandfather     Arthritis Father     Depression Father     Hyperlipidemia Father     Alcohol abuse Paternal Grandfather     Heart disease Paternal Grandfather     Diabetes Brother     Hypertension Brother        Social History:   Social History     Socioeconomic History    Marital status:    Tobacco Use    Smoking status: Never     Passive  exposure: Never    Smokeless tobacco: Never   Vaping Use    Vaping status: Never Used   Substance and Sexual Activity    Alcohol use: Yes     Comment: Once a month at most.    Drug use: Never    Sexual activity: Yes     Partners: Male     Birth control/protection: Condom, Coitus interruptus, Natural family planning/Rhythm       Medications:     Current Outpatient Medications:     buPROPion XL (WELLBUTRIN XL) 150 MG 24 hr tablet, Take 1 tablet by mouth Daily., Disp: 90 tablet, Rfl: 3    clidinium-chlordiazePOXIDE (LIBRAX) 5-2.5 MG per capsule, Take 1 capsule by mouth 3 (Three) Times a Day With Meals., Disp: 270 capsule, Rfl: 1    esomeprazole (nexIUM) 40 MG capsule, Take 1 capsule by mouth Every Morning Before Breakfast., Disp: 90 capsule, Rfl: 3    Fremanezumab-vfrm (Ajovy) 225 MG/1.5ML solution auto-injector, Inject 225 mg under the skin into the appropriate area as directed Every 30 (Thirty) Days., Disp: 6 mL, Rfl: 3    metFORMIN (GLUCOPHAGE) 500 MG tablet, Take 1 tablet by mouth Daily With Breakfast., Disp: , Rfl:     miconazole (MICOTIN) 2 % cream, Apply 1 Application topically to the appropriate area as directed 2 (Two) Times a Day., Disp: 35 g, Rfl: 0    rizatriptan MLT (MAXALT-MLT) 10 MG disintegrating tablet, Place 1 tablet on the tongue 1 (One) Time As Needed for Migraine for up to 1 dose. May repeat in 2 hours if needed, Disp: 27 tablet, Rfl: 3    sertraline (ZOLOFT) 50 MG tablet, TAKE 1 TABLET DAILY, Disp: 90 tablet, Rfl: 0    Allergies:   Allergies   Allergen Reactions    Sulfamethoxazole Other (See Comments)     Erythema nodosum    Sulfamethoxazole-Trimethoprim Anaphylaxis, Anxiety, Dermatitis, Hives, Itching, Nausea Only, Palpitations, Rash and Swelling    Trimethoprim Other (See Comments)     Erythema nodosum    Imipramine Nausea Only    Topiramate Other (See Comments)       Objective     Physical Exam:  Vital Signs:   Vitals:    04/30/25 1420   BP: 118/82   Pulse: 83   SpO2: 100%   Weight: 89.8 kg  "(198 lb)   Height: 165.1 cm (65\")   PainSc: 4    PainLoc: Ankle  Comment: back     Body mass index is 32.95 kg/m².     Physical Exam  Vitals and nursing note reviewed.   Constitutional:       General: She is not in acute distress.     Appearance: She is not diaphoretic.   HENT:      Head: Normocephalic and atraumatic.      Right Ear: Tympanic membrane, ear canal and external ear normal.      Left Ear: Tympanic membrane, ear canal and external ear normal.      Mouth/Throat:      Mouth: Mucous membranes are moist. Mucous membranes are dry.   Eyes:      Extraocular Movements: Extraocular movements intact.      Pupils: Pupils are equal, round, and reactive to light.   Cardiovascular:      Rate and Rhythm: Normal rate and regular rhythm.   Pulmonary:      Effort: Pulmonary effort is normal. No respiratory distress.      Breath sounds: Normal breath sounds. No wheezing or rales.   Abdominal:      General: Abdomen is flat. Bowel sounds are normal.      Palpations: Abdomen is soft. There is no mass.      Tenderness: There is no guarding or rebound.   Musculoskeletal:         General: Normal range of motion.      Cervical back: Normal range of motion and neck supple.      Right lower leg: No edema.      Left lower leg: No edema.   Skin:     General: Skin is warm and dry.   Neurological:      General: No focal deficit present.      Mental Status: She is alert and oriented to person, place, and time.      Motor: No weakness.   Psychiatric:         Mood and Affect: Mood normal.     Tender under the tarsal tunnel. left  Physical Exam  Mouth/Throat: Good teeth  Gastrointestinal: Pain in abdomen, constipation, diarrhea, swelling  Musculoskeletal: Tibial nerve pain, tarsal tunnel syndrome, upper middle back pain possibly due to bulging thoracic disk    Results  Labs   - Cholesterol levels: Elevated    Procedures      Assessment / Plan      Assessment/Plan:   Diagnoses and all orders for this visit:    1. Mixed hyperlipidemia " (Primary)  -     Lipid Panel; Future    2. Migraine without status migrainosus, not intractable, unspecified migraine type  Comments:  Continue current medications  Orders:  -     rizatriptan MLT (MAXALT-MLT) 10 MG disintegrating tablet; Place 1 tablet on the tongue 1 (One) Time As Needed for Migraine for up to 1 dose. May repeat in 2 hours if needed  Dispense: 27 tablet; Refill: 3    3. Gastroesophageal reflux disease without esophagitis  Comments:  Continue current medications.  Orders:  -     esomeprazole (nexIUM) 40 MG capsule; Take 1 capsule by mouth Every Morning Before Breakfast.  Dispense: 90 capsule; Refill: 3  -     clidinium-chlordiazePOXIDE (LIBRAX) 5-2.5 MG per capsule; Take 1 capsule by mouth 3 (Three) Times a Day With Meals.  Dispense: 270 capsule; Refill: 1    4. Physical exam    5. Iron deficiency anemia, unspecified iron deficiency anemia type    6. Megaloblastic anemia  -     Vitamin B12 & Folate; Future    7. Vitamin D deficiency  -     Cancel: Vitamin D,25-Hydroxy  -     Vitamin D,25-Hydroxy; Future    8. Other fatigue  -     Comprehensive Metabolic Panel; Future  -     CBC & Differential; Future  -     TSH; Future    9. High glucose  -     Hemoglobin A1c; Future    10. Depression, unspecified depression type    Other orders  -     Discontinue: Fremanezumab-vfrm (Ajovy) 225 MG/1.5ML solution auto-injector; Inject 225 mg under the skin into the appropriate area as directed Every 30 (Thirty) Days.  Dispense: 6 mL; Refill: 3  -     buPROPion XL (WELLBUTRIN XL) 150 MG 24 hr tablet; Take 1 tablet by mouth Daily.  Dispense: 90 tablet; Refill: 3  -     Fremanezumab-vfrm (Ajovy) 225 MG/1.5ML solution auto-injector; Inject 225 mg under the skin into the appropriate area as directed Every 30 (Thirty) Days.  Dispense: 6 mL; Refill: 3       Assessment & Plan  1. Mixed hyperlipidemia.  - She stopped taking Lipitor 80 mg in 11/2024 due to joint pain.  - Cholesterol levels are expected to be elevated.  - A  comprehensive panel of laboratory tests, including TSH, CBC, CMP, and glucose, will be ordered.  - Monitoring of lipid levels and consideration of alternative lipid-lowering therapies.    2. Esophageal dysphagia.  - Esophagus was stretched in 08/2024.  - Currently taking Nexium 40 mg, which is effective.  - No current symptoms of dysphagia.  - Continued use of Nexium 40 mg.    3. Depression.  - Currently taking Wellbutrin  mg with good effect.  - Not taking Zoloft.  - Reports improvement in depressive symptoms.  - Continued prescription of Wellbutrin  mg.    4. Migraines.  - Using Ajovy 225 mg (1.5 mL) subcutaneously once a month.  - Maxalt MLT for breakthrough migraines.  - Reports Ajovy works well for migraine prevention.  - Refills for Ajovy and Maxalt MLT will be provided.    5. Polycystic ovary syndrome (PCOS).  - Taking metformin for management.  - Reports continued use of metformin.  - No new symptoms reported.  - Refill for metformin will be provided.    6. Tarsal tunnel syndrome.  - Experiences pain that shoots down to the big toe and sometimes hurts to touch the skin.  - Pain affects daily activities and sleep.  - Recommended stretching exercises before activities like doing dishes or laundry.  - Consideration of further evaluation and management if symptoms persist.    7. Thoracic disc herniation.  - Reports upper middle back pain that wraps around and feels like a stabbing sensation.  - Pain exacerbated by activities such as doing dishes or laundry.  - Traction therapy recommended to alleviate symptoms.  - Further evaluation and management to be considered if symptoms persist.    Follow Up:   Return in about 1 year (around 4/30/2026).    Patient or patient representative verbalized consent for the use of Ambient Listening during the visit with  Noah Manjarrez MD for chart documentation. 4/30/2025  15:46 EDT     @[email or web address] [place]

## 2025-05-01 ENCOUNTER — LAB (OUTPATIENT)
Dept: FAMILY MEDICINE CLINIC | Facility: CLINIC | Age: 40
End: 2025-05-01
Payer: COMMERCIAL

## 2025-05-02 LAB
25(OH)D3+25(OH)D2 SERPL-MCNC: 31.9 NG/ML (ref 30–100)
ALBUMIN SERPL-MCNC: 4.6 G/DL (ref 3.9–4.9)
ALP SERPL-CCNC: 58 IU/L (ref 44–121)
ALT SERPL-CCNC: 12 IU/L (ref 0–32)
AST SERPL-CCNC: 14 IU/L (ref 0–40)
BASOPHILS # BLD AUTO: 0.1 X10E3/UL (ref 0–0.2)
BASOPHILS NFR BLD AUTO: 1 %
BILIRUB SERPL-MCNC: 0.7 MG/DL (ref 0–1.2)
BUN SERPL-MCNC: 14 MG/DL (ref 6–20)
BUN/CREAT SERPL: 18 (ref 9–23)
CALCIUM SERPL-MCNC: 9.6 MG/DL (ref 8.7–10.2)
CHLORIDE SERPL-SCNC: 99 MMOL/L (ref 96–106)
CHOLEST SERPL-MCNC: 277 MG/DL (ref 100–199)
CO2 SERPL-SCNC: 21 MMOL/L (ref 20–29)
CREAT SERPL-MCNC: 0.78 MG/DL (ref 0.57–1)
EGFRCR SERPLBLD CKD-EPI 2021: 99 ML/MIN/1.73
EOSINOPHIL # BLD AUTO: 0.1 X10E3/UL (ref 0–0.4)
EOSINOPHIL NFR BLD AUTO: 2 %
ERYTHROCYTE [DISTWIDTH] IN BLOOD BY AUTOMATED COUNT: 13.4 % (ref 11.7–15.4)
FOLATE SERPL-MCNC: 8 NG/ML
GLOBULIN SER CALC-MCNC: 2.9 G/DL (ref 1.5–4.5)
GLUCOSE SERPL-MCNC: 72 MG/DL (ref 70–99)
HBA1C MFR BLD: 5.1 % (ref 4.8–5.6)
HCT VFR BLD AUTO: 40.2 % (ref 34–46.6)
HDLC SERPL-MCNC: 45 MG/DL
HGB BLD-MCNC: 13 G/DL (ref 11.1–15.9)
IMM GRANULOCYTES # BLD AUTO: 0 X10E3/UL (ref 0–0.1)
IMM GRANULOCYTES NFR BLD AUTO: 0 %
LDLC SERPL CALC-MCNC: 187 MG/DL (ref 0–99)
LYMPHOCYTES # BLD AUTO: 1.7 X10E3/UL (ref 0.7–3.1)
LYMPHOCYTES NFR BLD AUTO: 20 %
MCH RBC QN AUTO: 29.4 PG (ref 26.6–33)
MCHC RBC AUTO-ENTMCNC: 32.3 G/DL (ref 31.5–35.7)
MCV RBC AUTO: 91 FL (ref 79–97)
MONOCYTES # BLD AUTO: 0.5 X10E3/UL (ref 0.1–0.9)
MONOCYTES NFR BLD AUTO: 5 %
NEUTROPHILS # BLD AUTO: 6.4 X10E3/UL (ref 1.4–7)
NEUTROPHILS NFR BLD AUTO: 72 %
PLATELET # BLD AUTO: 254 X10E3/UL (ref 150–450)
POTASSIUM SERPL-SCNC: 4.2 MMOL/L (ref 3.5–5.2)
PROT SERPL-MCNC: 7.5 G/DL (ref 6–8.5)
RBC # BLD AUTO: 4.42 X10E6/UL (ref 3.77–5.28)
SODIUM SERPL-SCNC: 136 MMOL/L (ref 134–144)
TRIGL SERPL-MCNC: 235 MG/DL (ref 0–149)
TSH SERPL DL<=0.005 MIU/L-ACNC: 1.21 UIU/ML (ref 0.45–4.5)
VIT B12 SERPL-MCNC: 389 PG/ML (ref 232–1245)
VLDLC SERPL CALC-MCNC: 45 MG/DL (ref 5–40)
WBC # BLD AUTO: 8.8 X10E3/UL (ref 3.4–10.8)

## 2025-05-03 ENCOUNTER — RESULTS FOLLOW-UP (OUTPATIENT)
Dept: FAMILY MEDICINE CLINIC | Facility: CLINIC | Age: 40
End: 2025-05-03
Payer: COMMERCIAL

## 2025-05-05 DIAGNOSIS — K21.9 GASTROESOPHAGEAL REFLUX DISEASE WITHOUT ESOPHAGITIS: ICD-10-CM

## 2025-05-05 DIAGNOSIS — G43.909 MIGRAINE WITHOUT STATUS MIGRAINOSUS, NOT INTRACTABLE, UNSPECIFIED MIGRAINE TYPE: ICD-10-CM

## 2025-05-05 RX ORDER — CHLORDIAZEPOXIDE HYDROCHLORIDE AND CLIDINIUM BROMIDE 5; 2.5 MG/1; MG/1
1 CAPSULE ORAL
Qty: 270 CAPSULE | Refills: 1 | Status: SHIPPED | OUTPATIENT
Start: 2025-05-05

## 2025-05-05 RX ORDER — ESOMEPRAZOLE MAGNESIUM 40 MG/1
40 CAPSULE, DELAYED RELEASE ORAL
Qty: 90 CAPSULE | Refills: 3 | Status: SHIPPED | OUTPATIENT
Start: 2025-05-05

## 2025-05-05 RX ORDER — RIZATRIPTAN BENZOATE 10 MG/1
10 TABLET, ORALLY DISINTEGRATING ORAL ONCE AS NEEDED
Qty: 27 TABLET | Refills: 3 | Status: SHIPPED | OUTPATIENT
Start: 2025-05-05

## 2025-05-05 RX ORDER — BUPROPION HYDROCHLORIDE 150 MG/1
150 TABLET ORAL DAILY
Qty: 90 TABLET | Refills: 3 | Status: SHIPPED | OUTPATIENT
Start: 2025-05-05

## 2025-05-05 RX ORDER — FREMANEZUMAB-VFRM 225 MG/1.5ML
225 INJECTION SUBCUTANEOUS
Qty: 6 ML | Refills: 3 | Status: SHIPPED | OUTPATIENT
Start: 2025-05-05

## 2025-05-28 ENCOUNTER — OFFICE VISIT (OUTPATIENT)
Dept: FAMILY MEDICINE CLINIC | Facility: CLINIC | Age: 40
End: 2025-05-28
Payer: COMMERCIAL

## 2025-05-28 VITALS
HEART RATE: 70 BPM | BODY MASS INDEX: 33.15 KG/M2 | SYSTOLIC BLOOD PRESSURE: 108 MMHG | HEIGHT: 65 IN | OXYGEN SATURATION: 100 % | DIASTOLIC BLOOD PRESSURE: 82 MMHG | WEIGHT: 199 LBS

## 2025-05-28 DIAGNOSIS — M76.892 HIP TENDONITIS, LEFT: Primary | ICD-10-CM

## 2025-05-28 PROCEDURE — 99213 OFFICE O/P EST LOW 20 MIN: CPT | Performed by: FAMILY MEDICINE

## 2025-05-28 RX ORDER — INDOMETHACIN 75 MG/1
75 CAPSULE, EXTENDED RELEASE ORAL
Qty: 60 CAPSULE | Refills: 0 | Status: SHIPPED | OUTPATIENT
Start: 2025-05-28

## 2025-05-28 NOTE — PROGRESS NOTES
Follow Up Office Visit      Date of Visit:  2025   Patient Name: Amanda Le  : 1985   MRN: [id number]     Chief Complaint:    Chief Complaint   Patient presents with    Hip Pain     X1 week  Left. Hurts when turning/bending and walking         History of Present Illness: Amanda Le is a 39 y.o. female who is here today for follow up.    History of Present Illness  The patient presents for evaluation of left hip pain.    She reports experiencing a sharp, stabbing pain in her left hip, which is particularly noticeable during ambulation or when shifting her weight. The pain is constant and has been present since she took an unusual step approximately one week ago. Despite attempts to manage the pain with Mobic, which provides temporary relief for approximately 4 to 5 hours, the discomfort persists. She has also tried applying ice and limiting her mobility over the weekend, but these measures have not alleviated the pain. There is no history of arthritis in the hip. The pain is severe enough to cause her to halt her movements, but it does not interfere with her sleep. She is able to walk, although with difficulty due to the pain. The pain is absent when she is seated but returns upon standing and stepping. She has previously been prescribed prednisone, but it caused side effects such as hot flashes, heart palpitations, and insomnia.      Subjective      Review of Systems:   Review of Systems    Past Medical History:   Past Medical History:   Diagnosis Date    Anemia     Anxiety     I’ve always had it    Arthritis     Cholelithiasis     I had my gallbladder removed    Depression     GERD (gastroesophageal reflux disease) 2017    Headache     I’ve always had migraines    High cholesterol 2016    Lichen sclerosus     Low back pain     Obesity     I’ve always been overweight    oral abscess     drained    PCOS (polycystic ovarian syndrome)      Urinary tract infection 2004    I get 1 or 2 a year    Visual impairment Birth    I’ve always has visual snow       Past Surgical History:   Past Surgical History:   Procedure Laterality Date    CHOLECYSTECTOMY  2017    ESOPHAGEAL DILATATION  10/2019    spsms       Family History:   Family History   Problem Relation Age of Onset    Diabetes Mother     Coronary artery disease Mother     Anxiety disorder Mother     Arthritis Mother     Depression Mother     Heart disease Mother     Miscarriages / Stillbirths Mother         2x miscarriages    Diabetes Sister     Anxiety disorder Sister     Depression Sister     Hypertension Brother     Obesity Brother     Diabetes Maternal Grandfather     Alzheimer's disease Maternal Grandfather     Alcohol abuse Maternal Grandfather     Arthritis Father     Depression Father     Hyperlipidemia Father     Alcohol abuse Paternal Grandfather     Heart disease Paternal Grandfather     Diabetes Brother     Hypertension Brother        Social History:   Social History     Socioeconomic History    Marital status:    Tobacco Use    Smoking status: Never     Passive exposure: Never    Smokeless tobacco: Never   Vaping Use    Vaping status: Never Used   Substance and Sexual Activity    Alcohol use: Yes     Comment: Once a month at most.    Drug use: Never    Sexual activity: Yes     Partners: Male     Birth control/protection: Condom, Coitus interruptus, Natural family planning/Rhythm       Medications:     Current Outpatient Medications:     buPROPion XL (WELLBUTRIN XL) 150 MG 24 hr tablet, Take 1 tablet by mouth Daily., Disp: 90 tablet, Rfl: 3    clidinium-chlordiazePOXIDE (LIBRAX) 5-2.5 MG per capsule, Take 1 capsule by mouth 3 (Three) Times a Day With Meals., Disp: 270 capsule, Rfl: 1    esomeprazole (nexIUM) 40 MG capsule, Take 1 capsule by mouth Every Morning Before Breakfast., Disp: 90 capsule, Rfl: 3    Fremanezumab-vfrm (Ajovy) 225 MG/1.5ML solution auto-injector, Inject 225 mg  "under the skin into the appropriate area as directed Every 30 (Thirty) Days., Disp: 6 mL, Rfl: 3    metFORMIN (GLUCOPHAGE) 500 MG tablet, Take 1 tablet by mouth Daily With Breakfast., Disp: , Rfl:     miconazole (MICOTIN) 2 % cream, Apply 1 Application topically to the appropriate area as directed 2 (Two) Times a Day., Disp: 35 g, Rfl: 0    rizatriptan MLT (MAXALT-MLT) 10 MG disintegrating tablet, Place 1 tablet on the tongue 1 (One) Time As Needed for Migraine for up to 1 dose. May repeat in 2 hours if needed, Disp: 27 tablet, Rfl: 3    sertraline (ZOLOFT) 50 MG tablet, TAKE 1 TABLET DAILY, Disp: 90 tablet, Rfl: 0    indomethacin SR (INDOCIN SR) 75 MG CR capsule, Take 1 capsule by mouth Daily With Breakfast., Disp: 60 capsule, Rfl: 0    Allergies:   Allergies   Allergen Reactions    Sulfamethoxazole Other (See Comments)     Erythema nodosum    Sulfamethoxazole-Trimethoprim Anaphylaxis, Anxiety, Dermatitis, Hives, Itching, Nausea Only, Palpitations, Rash and Swelling    Trimethoprim Other (See Comments)     Erythema nodosum    Imipramine Nausea Only    Topiramate Other (See Comments)       Objective     Physical Exam:  Vital Signs:   Vitals:    05/28/25 0932   BP: 108/82   Pulse: 70   SpO2: 100%   Weight: 90.3 kg (199 lb)   Height: 165.1 cm (65\")   PainSc: 7    PainLoc: Hip     Body mass index is 33.12 kg/m².     Physical Exam  Constitutional:       General: She is not in acute distress.     Appearance: Normal appearance. She is obese. She is not toxic-appearing or diaphoretic.   HENT:      Head: Normocephalic and atraumatic.      Right Ear: External ear normal.      Left Ear: External ear normal.      Nose: Nose normal.   Eyes:      Pupils: Pupils are equal, round, and reactive to light.   Musculoskeletal:         General: Tenderness present.        Legs:       Comments: Good rom of lt and rt. Hip. That's tender there   Skin:     General: Skin is warm and dry.   Neurological:      Mental Status: She is alert. "   Psychiatric:         Mood and Affect: Mood normal.         Behavior: Behavior normal.       Physical Exam  Musculoskeletal: Tenderness noted in the left hip region, likely due to tendinitis. No signs of arthritis. Full range of motion intact.    Results      Procedures      Assessment / Plan      Assessment/Plan:   Diagnoses and all orders for this visit:    1. Hip tendonitis, left (Primary)    Other orders  -     indomethacin SR (INDOCIN SR) 75 MG CR capsule; Take 1 capsule by mouth Daily With Breakfast.  Dispense: 60 capsule; Refill: 0       Assessment & Plan  1. Left hip tendinitis.  - Symptoms suggest a potential strain in the rectus femoris long tendon, indicative of tendinitis.  - The condition is expected to resolve within a period of 2 to 6 weeks.  - A prescription for indomethacin will be provided, to be taken once daily for a duration of 10 days.  - She is advised to consume the medication with food and maintain adequate hydration. If there is no pain after 4 to 5 days, stretching exercises are recommended. Should there be no improvement in her condition, a follow-up visit will be necessary.    Follow Up:   No follow-ups on file.    Patient or patient representative verbalized consent for the use of Ambient Listening during the visit with  Noah Manjarrez MD for chart documentation. 5/28/2025  10:01 EDT     @[facility] [place]   Answers submitted by the patient for this visit:  Lower Extremity Injury Questionnaire (Submitted on 5/27/2025)  Chief Complaint: Extremity pain  Injury: No  Pain location: left hip, left upper leg  Pain quality: aching, shooting, stabbing  Pain - numeric: 7/10  Progression since onset: unchanged  tingling: No  inability to bear weight: Yes  lower extremity swelling: No  redness: No  Additional information: I feel it when I step down on the left, especially if I’m shifting weight.